# Patient Record
Sex: FEMALE | Race: WHITE | NOT HISPANIC OR LATINO | Employment: FULL TIME | URBAN - METROPOLITAN AREA
[De-identification: names, ages, dates, MRNs, and addresses within clinical notes are randomized per-mention and may not be internally consistent; named-entity substitution may affect disease eponyms.]

---

## 2019-07-12 ENCOUNTER — OFFICE VISIT (OUTPATIENT)
Dept: INTERNAL MEDICINE | Facility: CLINIC | Age: 24
End: 2019-07-12

## 2019-07-12 ENCOUNTER — HOSPITAL ENCOUNTER (OUTPATIENT)
Dept: GENERAL RADIOLOGY | Facility: HOSPITAL | Age: 24
Discharge: HOME | End: 2019-07-12

## 2019-07-12 ENCOUNTER — HOSPITAL ENCOUNTER (OUTPATIENT)
Dept: CT IMAGING | Facility: HOSPITAL | Age: 24
Discharge: HOME | End: 2019-07-12
Admitting: INTERNAL MEDICINE

## 2019-07-12 VITALS
HEART RATE: 78 BPM | HEIGHT: 65 IN | TEMPERATURE: 98.9 F | RESPIRATION RATE: 18 BRPM | WEIGHT: 176.2 LBS | SYSTOLIC BLOOD PRESSURE: 110 MMHG | OXYGEN SATURATION: 97 % | DIASTOLIC BLOOD PRESSURE: 72 MMHG | BODY MASS INDEX: 29.36 KG/M2

## 2019-07-12 DIAGNOSIS — R23.0 PERIORAL CYANOSIS: ICD-10-CM

## 2019-07-12 DIAGNOSIS — J45.50 SEVERE PERSISTENT ASTHMA, UNSPECIFIED WHETHER COMPLICATED: ICD-10-CM

## 2019-07-12 DIAGNOSIS — R01.1 HEART MURMUR: ICD-10-CM

## 2019-07-12 DIAGNOSIS — J45.50 SEVERE PERSISTENT ASTHMA, UNSPECIFIED WHETHER COMPLICATED: Primary | ICD-10-CM

## 2019-07-12 LAB
ALBUMIN SERPL-MCNC: 4.6 G/DL (ref 3.5–5.2)
ALBUMIN/GLOB SERPL: 1.6 G/DL
ALP SERPL-CCNC: 64 U/L (ref 39–117)
ALT SERPL W P-5'-P-CCNC: 18 U/L (ref 1–33)
ANION GAP SERPL CALCULATED.3IONS-SCNC: 15 MMOL/L (ref 5–15)
AST SERPL-CCNC: 16 U/L (ref 1–32)
BILIRUB SERPL-MCNC: 0.8 MG/DL (ref 0.2–1.2)
BUN BLD-MCNC: 12 MG/DL (ref 6–20)
BUN/CREAT SERPL: 11.9 (ref 7–25)
CALCIUM SPEC-SCNC: 9.9 MG/DL (ref 8.6–10.5)
CHLORIDE SERPL-SCNC: 101 MMOL/L (ref 98–107)
CO2 SERPL-SCNC: 24 MMOL/L (ref 22–29)
CREAT BLD-MCNC: 1.01 MG/DL (ref 0.57–1)
D DIMER PPP FEU-MCNC: <0.27 MCGFEU/ML (ref 0–0.56)
DEPRECATED RDW RBC AUTO: 40.4 FL (ref 37–54)
ERYTHROCYTE [DISTWIDTH] IN BLOOD BY AUTOMATED COUNT: 12.2 % (ref 12.3–15.4)
GFR SERPL CREATININE-BSD FRML MDRD: 67 ML/MIN/1.73
GLOBULIN UR ELPH-MCNC: 2.9 GM/DL
GLUCOSE BLD-MCNC: 94 MG/DL (ref 65–99)
HCT VFR BLD AUTO: 38.1 % (ref 34–46.6)
HGB BLD-MCNC: 12.3 G/DL (ref 12–15.9)
MCH RBC QN AUTO: 29.4 PG (ref 26.6–33)
MCHC RBC AUTO-ENTMCNC: 32.3 G/DL (ref 31.5–35.7)
MCV RBC AUTO: 90.9 FL (ref 79–97)
NT-PROBNP SERPL-MCNC: 10.2 PG/ML (ref 5–450)
PLATELET # BLD AUTO: 296 10*3/MM3 (ref 140–450)
PMV BLD AUTO: 11.9 FL (ref 6–12)
POTASSIUM BLD-SCNC: 4 MMOL/L (ref 3.5–5.2)
PROT SERPL-MCNC: 7.5 G/DL (ref 6–8.5)
RBC # BLD AUTO: 4.19 10*6/MM3 (ref 3.77–5.28)
SODIUM BLD-SCNC: 140 MMOL/L (ref 136–145)
TSH SERPL DL<=0.05 MIU/L-ACNC: 0.75 MIU/ML (ref 0.27–4.2)
WBC NRBC COR # BLD: 5.69 10*3/MM3 (ref 3.4–10.8)

## 2019-07-12 PROCEDURE — 99204 OFFICE O/P NEW MOD 45 MIN: CPT | Performed by: INTERNAL MEDICINE

## 2019-07-12 PROCEDURE — 83880 ASSAY OF NATRIURETIC PEPTIDE: CPT | Performed by: INTERNAL MEDICINE

## 2019-07-12 PROCEDURE — 80053 COMPREHEN METABOLIC PANEL: CPT | Performed by: INTERNAL MEDICINE

## 2019-07-12 PROCEDURE — 36415 COLL VENOUS BLD VENIPUNCTURE: CPT | Performed by: INTERNAL MEDICINE

## 2019-07-12 PROCEDURE — 71275 CT ANGIOGRAPHY CHEST: CPT

## 2019-07-12 PROCEDURE — 0 IOPAMIDOL PER 1 ML: Performed by: INTERNAL MEDICINE

## 2019-07-12 PROCEDURE — 71046 X-RAY EXAM CHEST 2 VIEWS: CPT

## 2019-07-12 PROCEDURE — 85379 FIBRIN DEGRADATION QUANT: CPT | Performed by: INTERNAL MEDICINE

## 2019-07-12 PROCEDURE — 85027 COMPLETE CBC AUTOMATED: CPT | Performed by: INTERNAL MEDICINE

## 2019-07-12 PROCEDURE — 93000 ELECTROCARDIOGRAM COMPLETE: CPT | Performed by: INTERNAL MEDICINE

## 2019-07-12 PROCEDURE — 84443 ASSAY THYROID STIM HORMONE: CPT | Performed by: INTERNAL MEDICINE

## 2019-07-12 RX ORDER — VALACYCLOVIR HYDROCHLORIDE 500 MG/1
500 TABLET, FILM COATED ORAL DAILY PRN
Refills: 3 | COMMUNITY
Start: 2019-04-29

## 2019-07-12 RX ORDER — DROSPIRENONE AND ETHINYL ESTRADIOL 0.02-3(28)
KIT ORAL DAILY
Refills: 0 | COMMUNITY
Start: 2019-06-03 | End: 2020-11-06

## 2019-07-12 RX ORDER — CHLORCYCLIZINE HYDROCHLORIDE AND PSEUDOEPHEDRINE HYDROCHLORIDE 25; 60 MG/1; MG/1
TABLET ORAL
Refills: 4 | COMMUNITY
Start: 2019-04-29 | End: 2021-09-24 | Stop reason: SDUPTHER

## 2019-07-12 RX ORDER — ALBUTEROL SULFATE 90 UG/1
2 AEROSOL, METERED RESPIRATORY (INHALATION) EVERY 4 HOURS PRN
COMMUNITY

## 2019-07-12 RX ORDER — BUDESONIDE AND FORMOTEROL FUMARATE DIHYDRATE 160; 4.5 UG/1; UG/1
2 AEROSOL RESPIRATORY (INHALATION)
COMMUNITY
Start: 2019-07-11 | End: 2020-12-08 | Stop reason: SDUPTHER

## 2019-07-12 RX ORDER — ALBUTEROL SULFATE 2.5 MG/3ML
2.5 SOLUTION RESPIRATORY (INHALATION) EVERY 4 HOURS PRN
COMMUNITY
End: 2019-07-19

## 2019-07-12 RX ADMIN — IOPAMIDOL 75 ML: 755 INJECTION, SOLUTION INTRAVENOUS at 15:55

## 2019-07-12 NOTE — ASSESSMENT & PLAN NOTE
Stable. Followed by Dr. Loyd. Continue Symbicort BID, PRN albuterol (rare use) and  Stayhist for allergies. Keep upcoming f/u appt.

## 2019-07-12 NOTE — ASSESSMENT & PLAN NOTE
Isolated episode of reported perioral cyanosis 4 weeks ago concerning for possible cardiac or pulmonary condition. Incomplete RBBB on EKG today (no baseline) and with new murmur on exam (possibly just a benign flow murmur) but there sounds like a FHx of some type of [place] [facility] [place] obtain CXR and ECHO. Due to abnormal EKG and exam findings, will also get CT Chest PE. Will also check CBC, CMP, BNP, D-dimer. Also would benefit from cardiology evaluation given low normal RA O2 sat during trending pOx. Advised to seek immediate/emergency medical care if she has sudden CP, SOB, inability to lie flat or for reoccurent perioral cyanosis.

## 2019-07-15 ENCOUNTER — HOSPITAL ENCOUNTER (OUTPATIENT)
Dept: CARDIOLOGY | Facility: HOSPITAL | Age: 24
Discharge: HOME | End: 2019-07-15
Admitting: INTERNAL MEDICINE

## 2019-07-15 DIAGNOSIS — R91.1 LUNG NODULE: Primary | ICD-10-CM

## 2019-07-15 DIAGNOSIS — R01.1 HEART MURMUR: ICD-10-CM

## 2019-07-15 LAB
BH CV ECHO MEAS - AI DEC SLOPE: 211 CM/SEC^2
BH CV ECHO MEAS - AO MAX PG (FULL): 3.4 MMHG
BH CV ECHO MEAS - AO MAX PG: 7 MMHG
BH CV ECHO MEAS - AO MEAN PG (FULL): 1 MMHG
BH CV ECHO MEAS - AO MEAN PG: 3 MMHG
BH CV ECHO MEAS - AO ROOT AREA (BSA CORRECTED): 1.7
BH CV ECHO MEAS - AO ROOT AREA: 7.5 CM^2
BH CV ECHO MEAS - AO ROOT DIAM: 3.1 CM
BH CV ECHO MEAS - AO V2 MAX: 129 CM/SEC
BH CV ECHO MEAS - AO V2 MEAN: 84 CM/SEC
BH CV ECHO MEAS - AO V2 VTI: 23.7 CM
BH CV ECHO MEAS - AVA(I,A): 2.5 CM^2
BH CV ECHO MEAS - AVA(I,D): 2.5 CM^2
BH CV ECHO MEAS - AVA(V,A): 2.3 CM^2
BH CV ECHO MEAS - AVA(V,D): 2.3 CM^2
BH CV ECHO MEAS - BSA(HAYCOCK): 1.9 M^2
BH CV ECHO MEAS - BSA: 1.9 M^2
BH CV ECHO MEAS - BZI_BMI: 29.1 KILOGRAMS/M^2
BH CV ECHO MEAS - BZI_METRIC_HEIGHT: 165.1 CM
BH CV ECHO MEAS - BZI_METRIC_WEIGHT: 79.4 KG
BH CV ECHO MEAS - EDV(CUBED): 76.8 ML
BH CV ECHO MEAS - EDV(MOD-SP4): 106 ML
BH CV ECHO MEAS - EDV(TEICH): 80.8 ML
BH CV ECHO MEAS - EF(CUBED): 73.8 %
BH CV ECHO MEAS - EF(MOD-SP4): 61.3 %
BH CV ECHO MEAS - EF(TEICH): 66 %
BH CV ECHO MEAS - ESV(CUBED): 20.1 ML
BH CV ECHO MEAS - ESV(MOD-SP4): 41 ML
BH CV ECHO MEAS - ESV(TEICH): 27.5 ML
BH CV ECHO MEAS - FS: 36 %
BH CV ECHO MEAS - IVS/LVPW: 0.9
BH CV ECHO MEAS - IVSD: 0.75 CM
BH CV ECHO MEAS - LA DIMENSION: 2.8 CM
BH CV ECHO MEAS - LA/AO: 0.9
BH CV ECHO MEAS - LAD MAJOR: 4.8 CM
BH CV ECHO MEAS - LAT PEAK E' VEL: 11.2 CM/SEC
BH CV ECHO MEAS - LATERAL E/E' RATIO: 5.7
BH CV ECHO MEAS - LV DIASTOLIC VOL/BSA (35-75): 56.7 ML/M^2
BH CV ECHO MEAS - LV MASS(C)D: 101.1 GRAMS
BH CV ECHO MEAS - LV MASS(C)DI: 54.1 GRAMS/M^2
BH CV ECHO MEAS - LV MAX PG: 3.6 MMHG
BH CV ECHO MEAS - LV MEAN PG: 2 MMHG
BH CV ECHO MEAS - LV SYSTOLIC VOL/BSA (12-30): 21.9 ML/M^2
BH CV ECHO MEAS - LV V1 MAX: 94.7 CM/SEC
BH CV ECHO MEAS - LV V1 MEAN: 55.8 CM/SEC
BH CV ECHO MEAS - LV V1 VTI: 19 CM
BH CV ECHO MEAS - LVIDD: 4.3 CM
BH CV ECHO MEAS - LVIDS: 2.7 CM
BH CV ECHO MEAS - LVLD AP4: 7.4 CM
BH CV ECHO MEAS - LVLS AP4: 5.8 CM
BH CV ECHO MEAS - LVOT AREA (M): 3.1 CM^2
BH CV ECHO MEAS - LVOT AREA: 3.1 CM^2
BH CV ECHO MEAS - LVOT DIAM: 2 CM
BH CV ECHO MEAS - LVPWD: 0.83 CM
BH CV ECHO MEAS - MED PEAK E' VEL: 9.6 CM/SEC
BH CV ECHO MEAS - MEDIAL E/E' RATIO: 6.6
BH CV ECHO MEAS - MV A MAX VEL: 53.3 CM/SEC
BH CV ECHO MEAS - MV E MAX VEL: 63.7 CM/SEC
BH CV ECHO MEAS - MV E/A: 1.2
BH CV ECHO MEAS - PA ACC SLOPE: 561 CM/SEC^2
BH CV ECHO MEAS - PA ACC TIME: 0.18 SEC
BH CV ECHO MEAS - PA PR(ACCEL): -2.9 MMHG
BH CV ECHO MEAS - SI(AO): 95.7 ML/M^2
BH CV ECHO MEAS - SI(CUBED): 30.3 ML/M^2
BH CV ECHO MEAS - SI(LVOT): 31.9 ML/M^2
BH CV ECHO MEAS - SI(MOD-SP4): 34.8 ML/M^2
BH CV ECHO MEAS - SI(TEICH): 28.5 ML/M^2
BH CV ECHO MEAS - SV(AO): 178.9 ML
BH CV ECHO MEAS - SV(CUBED): 56.6 ML
BH CV ECHO MEAS - SV(LVOT): 59.7 ML
BH CV ECHO MEAS - SV(MOD-SP4): 65 ML
BH CV ECHO MEAS - SV(TEICH): 53.3 ML
BH CV ECHO MEASUREMENTS AVERAGE E/E' RATIO: 6.13
LEFT ATRIUM VOLUME INDEX: 19.3 ML/M^2
LEFT ATRIUM VOLUME: 36 ML
LV EF 2D ECHO EST: 60 %

## 2019-07-15 PROCEDURE — 93306 TTE W/DOPPLER COMPLETE: CPT

## 2019-07-15 PROCEDURE — 93306 TTE W/DOPPLER COMPLETE: CPT | Performed by: INTERNAL MEDICINE

## 2019-07-15 NOTE — PROGRESS NOTES
OFFICE PROGRESS NOTE    Chief Complaint   Patient presents with   • Follow-up       HPI: 23 y.o. female here for:    Last seen 7/12/2018 as a new patient to establish care.  She had mentioned that approximately 4 weeks prior to that visit she had an episode of extreme fatigue, dyspnea and reportedly 20 minutes of vincent-oral cyanosis.  She did not seek medical care as her symptoms self resolved.  On exam she had stable vitals although she had a new systolic murmur on exam.  EKG showed an incomplete right bundle branch block which did not have her prior baseline.  Feels that a trending pulse ox with lowest O2 sat of 92% on room air.  She had a CT PE which was negative but did make incidental note of an of right upper lobe nodule for which a 3-six-month follow-up imaging study was recommended.  Chest x-ray was negative.  TTE 7/15/2019 showed LVEF 60%.  Trace aortic insufficiency.  Cannot rule out bicuspid aortic valve.  Subsequently patient underwent NELIDA on 7/24/2019 which showed EF of 56-60%, normal systolic function, normal aortic valve structure which is trileaflet.  Labs included a CBC, CMP, TSH, BNP, d-dimer which were all normal.      She is a cardiology (Dr. Cornejo) on 7/19/2019 who ordered NELIDA as above.  If her near syncopal episodes return or worsen, plan is possibly for exercise stress test.  She is to have a 3-month follow-up which is been scheduled on 10/25/2019.    Patient states that she feels okay.  No further episodes of near syncope or perioral cyanosis.  She has not yet returned to a full exercise regimen like she was doing prior.  She is not having any chest pain or dizziness.  She is not having any shortness of breath.  She states her last PFTs with her asthma specialist Dr. Loyd were in March 2019 (prior to the delivery of her now 4-month-old daughter).  She is not sure when her next scheduled follow-up with this provider is.    Review of Systems   Constitutional: Negative for activity change,  "appetite change and fever.   HENT: Negative for congestion, sneezing and sore throat.    Eyes: Negative for discharge and visual disturbance.   Respiratory: Negative for cough and shortness of breath.    Cardiovascular: Negative for chest pain and palpitations.   Gastrointestinal: Negative for abdominal distention, abdominal pain, blood in stool, constipation, nausea and vomiting.   Endocrine: Negative for cold intolerance and heat intolerance.   Genitourinary: Negative for dysuria.   Musculoskeletal: Negative for neck stiffness.   Skin: Negative for rash.   Allergic/Immunologic: Negative for environmental allergies and food allergies.   Neurological: Negative for headache.   Hematological: Negative for adenopathy.   Psychiatric/Behavioral: Negative for depressed mood.       The following portions of the patient's history were reviewed and updated as appropriate: allergies, current medications, past family history, past medical history, past social history, past surgical history and problem list.      Physical Exam:  Vitals:    07/26/19 1526   BP: 120/68   BP Location: Right arm   Patient Position: Sitting   Cuff Size: Adult   Pulse: 72   Resp: 18   Temp: 97.2 °F (36.2 °C)   TempSrc: Temporal   SpO2: 98%   Weight: 78.1 kg (172 lb 3.2 oz)   Height: 165.1 cm (65\")       Physical Exam   Constitutional: She is oriented to person, place, and time. She appears well-developed and well-nourished. No distress.   HENT:   Head: Normocephalic and atraumatic.   Eyes: Conjunctivae are normal. Right eye exhibits no discharge. Left eye exhibits no discharge.   Neck: Normal range of motion. Neck supple. No thyromegaly present.   Cardiovascular: Normal rate, regular rhythm and intact distal pulses. Exam reveals no gallop and no friction rub.   Murmur (Faint systolic murmur, softer than before) heard.  Pulmonary/Chest: Effort normal and breath sounds normal. No stridor. No respiratory distress. She has no wheezes. She has no rales. "   Musculoskeletal: She exhibits no edema.   Lymphadenopathy:     She has no cervical adenopathy.   Neurological: She is alert and oriented to person, place, and time. She exhibits normal muscle tone.   Skin: Skin is warm and dry. Capillary refill takes less than 2 seconds. No rash noted. She is not diaphoretic.   Psychiatric: She has a normal mood and affect.   Vitals reviewed.     Assesment and Plan: 23 y.o. female here for:  Severe persistent asthma  Stable.  Followed by Dr. Loyd.  Still awaiting records.  Continue Synacort twice daily, PRN albuterol (rare use) and stay hist for allergies.  Patient will call to see when her next scheduled follow-up is and let us know.  We will also go ahead and order PFTs (as the most recent set were done prior to pregnancy and before she had the reported episode of perioral cyanosis).  Patient will slowly go ahead and increase her physical activity/exertion level.  If she has any recurrent shortness of breath/presyncope/perioral cyanosis she needs immediate evaluation.  At that point we would want to consider possible stress test.      Heart murmur  Mild, intermittent murmur today with unremarkable NELIDA except mild aortic insufficiency.  To keep scheduled cardiology follow-up in October.  Patient will slowly advance her physical activity level.  If she has recurrent symptoms she needs reevaluation and consider stress test per cardiology.      Return for 2-3 fasting RPE fasting.    Pat Fields MD  7/26/2019

## 2019-07-19 ENCOUNTER — CONSULT (OUTPATIENT)
Dept: CARDIOLOGY | Facility: CLINIC | Age: 24
End: 2019-07-19

## 2019-07-19 VITALS
WEIGHT: 175 LBS | DIASTOLIC BLOOD PRESSURE: 74 MMHG | BODY MASS INDEX: 29.16 KG/M2 | HEIGHT: 65 IN | HEART RATE: 84 BPM | SYSTOLIC BLOOD PRESSURE: 124 MMHG

## 2019-07-19 DIAGNOSIS — Q23.1 BICUSPID AORTIC VALVE: ICD-10-CM

## 2019-07-19 DIAGNOSIS — R01.1 HEART MURMUR: Primary | ICD-10-CM

## 2019-07-19 DIAGNOSIS — Q23.1 AORTIC INSUFFICIENCY DUE TO BICUSPID AORTIC VALVE: ICD-10-CM

## 2019-07-19 DIAGNOSIS — J45.50 SEVERE PERSISTENT ASTHMA, UNSPECIFIED WHETHER COMPLICATED: ICD-10-CM

## 2019-07-19 PROBLEM — Q23.81 AORTIC INSUFFICIENCY DUE TO BICUSPID AORTIC VALVE: Status: ACTIVE | Noted: 2019-07-19

## 2019-07-19 PROCEDURE — 99204 OFFICE O/P NEW MOD 45 MIN: CPT | Performed by: INTERNAL MEDICINE

## 2019-07-19 NOTE — H&P (VIEW-ONLY)
OFFICE VISIT  NOTE  [facility] CARDIOLOGY      Name: Prachi Bailey    Date: 2019  MRN:  [id number]  :  1995      REFERRING/PRIMARY PROVIDER:  Pat Fields MD    Chief Complaint   Patient presents with   • Shortness of Breath   • Heart Murmur       HPI: Prachi Bailey is a 23 y.o. female who presents today for new consultation for murmur, and possible bicuspid aortic valve.  Patient had an uncle with bicuspid aortic valve, murmur noted by PCP, echocardiogram ordered, mild aortic insufficiency noted, cannot exclude bicuspid aortic valve, normal ejection fraction, with normal aortic root and ascending aortic dimensions.  Recent echo 2019 shows normal sinus rhythm, heart rate 60, incomplete right bundle branch block.  She had an episode approximately 1 month ago when she was walking, she developed sudden weakness, she had walked approximately 1 and half miles and was placing her 4-month-old daughter in stroller.  She sat down, she states her lips became blue, the episode lasted approximately 1 hour she felt weak, but no shortness of breath or chest pain.  She was slightly lightheaded but no dizziness, no syncope.  No further episodes and no previous episodes.  She usually perform the same walk multiple times per week.  She reports a normal pregnancy, her blood pressure was elevated which required induction, but otherwise normal.  She reports that she has an uncle that was diagnosed with bicuspid aortic valve after myocardia infarction.  Her mother has symptoms of shortness of breath, she left cardiac work-up soon.  Asthma symptoms are well controlled with inhalers.  No previous history of syncope, or exercise intolerance.    Past Medical History:   Diagnosis Date   • Abnormal ECG 18   • Aortic insufficiency due to bicuspid aortic valve 2019   • Asthma    • Heart murmur 19   • Heart murmur        History reviewed. No pertinent surgical  history.    Social History     Socioeconomic History   • Marital status:      Spouse name: Not on file   • Number of children: Not on file   • Years of education: Not on file   • Highest education level: Not on file   Tobacco Use   • Smoking status: Never Smoker   • Smokeless tobacco: Never Used   Substance and Sexual Activity   • Alcohol use: Yes     Alcohol/week: 0.6 oz     Types: 1 Glasses of wine per week   • Drug use: No   • Sexual activity: Yes     Partners: Male     Birth control/protection: OCP       Family History   Problem Relation Age of Onset   • Asthma Father    • Hypertension Father    • Hyperlipidemia Father    • Heart attack Maternal Uncle    • Heart attack Paternal Grandfather    • Stroke Paternal Grandfather    • Hyperlipidemia Paternal Grandfather    • Hypertension Paternal Grandfather    • Asthma Sister         ROS:   Constitutional no fever,  no weight loss   Skin no rash, no subcutaneous nodules   Otolaryngeal no difficulty swallowing   Cardiovascular See HPI   Pulmonary no cough, no sputum production   Gastrointestinal no constipation, no diarrhea   Genitourinary no dysuria, no hematuria   Hematologic no easy bruisability, no abnormal bleeding   Musculoskeletal no muscle pain   Neurologic no dizziness, no falls         No Known Allergies      Current Outpatient Medications:   •  Albuterol (VENTOLIN IN), Inhale As Needed., Disp: , Rfl:   •  albuterol sulfate  (90 Base) MCG/ACT inhaler, Inhale 2 puffs Every 4 (Four) Hours As Needed for Wheezing., Disp: , Rfl:   •  drospirenone-ethinyl estradiol (RADHA,GIANVI) 3-0.02 MG per tablet, Take  by mouth Daily., Disp: , Rfl: 0  •  STAHIST AD 25-60 MG tablet, TK 1 T PO Q 12 HOURS PRN, Disp: , Rfl: 4  •  SYMBICORT 160-4.5 MCG/ACT inhaler, , Disp: , Rfl:   •  valACYclovir (VALTREX) 500 MG tablet, Take 500 mg by mouth Daily., Disp: , Rfl: 3    Vitals:    07/19/19 1247   BP: 124/74   BP Location: Left arm   Patient Position: Sitting   Pulse: 84  "  Weight: 79.4 kg (175 lb)   Height: 165.1 cm (65\")     Body mass index is 29.12 kg/m².    PHYSICAL EXAM:    General Appearance:   · well developed  · well nourished  HENT:   · oropharynx moist  · lips not cyanotic  Neck:  · thyroid not enlarged  · supple  Respiratory:  · no respiratory distress  · normal breath sounds  · no rales  Cardiovascular:  · no jugular venous distention  · regular rhythm  · apical impulse normal  · S1 normal, S2 normal  · no S3, no S4   · 2/6 systolic murmur, best heard at the sternal border.  · no rub, no thrill  · carotid pulses normal; no bruit  · pedal pulses normal  · lower extremity edema: none    Gastrointestinal:   · bowel sounds normal  · non-tender  · no hepatomegaly, no splenomegaly  Musculoskeletal:  · no clubbing of fingers.   · normocephalic, head atraumatic  Skin:   · warm, dry  Psychiatric:  · judgement and insight appropriate  · normal mood and affect    RESULTS:   Procedures    Results for orders placed during the hospital encounter of 07/15/19   Adult Transthoracic Echo Complete W/ Cont if Necessary Per Protocol    Narrative · Left ventricular systolic function is normal. Estimated EF = 60%.  · Trace aortic insufficiency. Cannot rule out bicuspid aortic valve.            Labs:  Lab Results   Component Value Date    AST 16 07/12/2019    ALT 18 07/12/2019     No results found for: HGBA1C  No components found for: CREATINININE  eGFR Non  Amer   Date Value Ref Range Status   07/12/2019 67 >60 mL/min/1.73 Final         ASSESSMENT:  Problem List Items Addressed This Visit        Cardiovascular and Mediastinum    Heart murmur - Primary    Aortic insufficiency due to bicuspid aortic valve       Respiratory    Severe persistent asthma    Relevant Medications    Albuterol (VENTOLIN IN)          PLAN:    1.  Cardiac murmur:  Transthoracic echo images personally reviewed, I reviewed them with the patient in the office as well  Unfortunately there was artifact and inability to " see the aortic leaflets clearly  Therefore recommend a transesophageal echocardiogram  All risk benefits alternatives explained in detail with the patient, she is agreeable to proceed.  Aortic root and ascending aorta dimensions normal on transthoracic echo, will reevaluate with NELIDA.    If bicuspid aortic valve is confirmed by NELIDA, all first-degree relatives will need to be screened with echo.    2.  Possible bicuspid aortic valve with mild aortic insufficiency by transthoracic echo:  Proceed with NELIDA for further evaluation and work-up.    3.  Near syncopal episode:  She denies palpitations, denies chest pain, denies shortness of breath  Unlikely that this event was related to possible bicuspid aortic valve and mild aortic insufficiency  Recommend patient increase her water intake  Call us if symptoms return or worsen and we may proceed with exercise stress test.    4.  Severe persistent asthma:  Continue Symbicort and albuterol as needed.    Return to clinic in 3 months    Thank you for the opportunity to share in the care of your patient; please do not hesitate to call me with any questions.     Michael Cornejo MD, [facility]  Office: [phone] [address] [place] [address]

## 2019-07-19 NOTE — PROGRESS NOTES
OFFICE VISIT  NOTE  [facility] CARDIOLOGY      Name: Prachi Bailey    Date: 2019  MRN:  [id number]  :  1995      REFERRING/PRIMARY PROVIDER:  Pat Fields MD    Chief Complaint   Patient presents with   • Shortness of Breath   • Heart Murmur       HPI: Prachi Bailey is a 23 y.o. female who presents today for new consultation for murmur, and possible bicuspid aortic valve.  Patient had an uncle with bicuspid aortic valve, murmur noted by PCP, echocardiogram ordered, mild aortic insufficiency noted, cannot exclude bicuspid aortic valve, normal ejection fraction, with normal aortic root and ascending aortic dimensions.  Recent echo 2019 shows normal sinus rhythm, heart rate 60, incomplete right bundle branch block.  She had an episode approximately 1 month ago when she was walking, she developed sudden weakness, she had walked approximately 1 and half miles and was placing her 4-month-old daughter in stroller.  She sat down, she states her lips became blue, the episode lasted approximately 1 hour she felt weak, but no shortness of breath or chest pain.  She was slightly lightheaded but no dizziness, no syncope.  No further episodes and no previous episodes.  She usually perform the same walk multiple times per week.  She reports a normal pregnancy, her blood pressure was elevated which required induction, but otherwise normal.  She reports that she has an uncle that was diagnosed with bicuspid aortic valve after myocardia infarction.  Her mother has symptoms of shortness of breath, she left cardiac work-up soon.  Asthma symptoms are well controlled with inhalers.  No previous history of syncope, or exercise intolerance.    Past Medical History:   Diagnosis Date   • Abnormal ECG 18   • Aortic insufficiency due to bicuspid aortic valve 2019   • Asthma    • Heart murmur 19   • Heart murmur        History reviewed. No pertinent surgical  history.    Social History     Socioeconomic History   • Marital status:      Spouse name: Not on file   • Number of children: Not on file   • Years of education: Not on file   • Highest education level: Not on file   Tobacco Use   • Smoking status: Never Smoker   • Smokeless tobacco: Never Used   Substance and Sexual Activity   • Alcohol use: Yes     Alcohol/week: 0.6 oz     Types: 1 Glasses of wine per week   • Drug use: No   • Sexual activity: Yes     Partners: Male     Birth control/protection: OCP       Family History   Problem Relation Age of Onset   • Asthma Father    • Hypertension Father    • Hyperlipidemia Father    • Heart attack Maternal Uncle    • Heart attack Paternal Grandfather    • Stroke Paternal Grandfather    • Hyperlipidemia Paternal Grandfather    • Hypertension Paternal Grandfather    • Asthma Sister         ROS:   Constitutional no fever,  no weight loss   Skin no rash, no subcutaneous nodules   Otolaryngeal no difficulty swallowing   Cardiovascular See HPI   Pulmonary no cough, no sputum production   Gastrointestinal no constipation, no diarrhea   Genitourinary no dysuria, no hematuria   Hematologic no easy bruisability, no abnormal bleeding   Musculoskeletal no muscle pain   Neurologic no dizziness, no falls         No Known Allergies      Current Outpatient Medications:   •  Albuterol (VENTOLIN IN), Inhale As Needed., Disp: , Rfl:   •  albuterol sulfate  (90 Base) MCG/ACT inhaler, Inhale 2 puffs Every 4 (Four) Hours As Needed for Wheezing., Disp: , Rfl:   •  drospirenone-ethinyl estradiol (RADHA,GIANVI) 3-0.02 MG per tablet, Take  by mouth Daily., Disp: , Rfl: 0  •  STAHIST AD 25-60 MG tablet, TK 1 T PO Q 12 HOURS PRN, Disp: , Rfl: 4  •  SYMBICORT 160-4.5 MCG/ACT inhaler, , Disp: , Rfl:   •  valACYclovir (VALTREX) 500 MG tablet, Take 500 mg by mouth Daily., Disp: , Rfl: 3    Vitals:    07/19/19 1247   BP: 124/74   BP Location: Left arm   Patient Position: Sitting   Pulse: 84  "  Weight: 79.4 kg (175 lb)   Height: 165.1 cm (65\")     Body mass index is 29.12 kg/m².    PHYSICAL EXAM:    General Appearance:   · well developed  · well nourished  HENT:   · oropharynx moist  · lips not cyanotic  Neck:  · thyroid not enlarged  · supple  Respiratory:  · no respiratory distress  · normal breath sounds  · no rales  Cardiovascular:  · no jugular venous distention  · regular rhythm  · apical impulse normal  · S1 normal, S2 normal  · no S3, no S4   · 2/6 systolic murmur, best heard at the sternal border.  · no rub, no thrill  · carotid pulses normal; no bruit  · pedal pulses normal  · lower extremity edema: none    Gastrointestinal:   · bowel sounds normal  · non-tender  · no hepatomegaly, no splenomegaly  Musculoskeletal:  · no clubbing of fingers.   · normocephalic, head atraumatic  Skin:   · warm, dry  Psychiatric:  · judgement and insight appropriate  · normal mood and affect    RESULTS:   Procedures    Results for orders placed during the hospital encounter of 07/15/19   Adult Transthoracic Echo Complete W/ Cont if Necessary Per Protocol    Narrative · Left ventricular systolic function is normal. Estimated EF = 60%.  · Trace aortic insufficiency. Cannot rule out bicuspid aortic valve.            Labs:  Lab Results   Component Value Date    AST 16 07/12/2019    ALT 18 07/12/2019     No results found for: HGBA1C  No components found for: CREATINININE  eGFR Non  Amer   Date Value Ref Range Status   07/12/2019 67 >60 mL/min/1.73 Final         ASSESSMENT:  Problem List Items Addressed This Visit        Cardiovascular and Mediastinum    Heart murmur - Primary    Aortic insufficiency due to bicuspid aortic valve       Respiratory    Severe persistent asthma    Relevant Medications    Albuterol (VENTOLIN IN)          PLAN:    1.  Cardiac murmur:  Transthoracic echo images personally reviewed, I reviewed them with the patient in the office as well  Unfortunately there was artifact and inability to " see the aortic leaflets clearly  Therefore recommend a transesophageal echocardiogram  All risk benefits alternatives explained in detail with the patient, she is agreeable to proceed.  Aortic root and ascending aorta dimensions normal on transthoracic echo, will reevaluate with NELIDA.    If bicuspid aortic valve is confirmed by NELIDA, all first-degree relatives will need to be screened with echo.    2.  Possible bicuspid aortic valve with mild aortic insufficiency by transthoracic echo:  Proceed with NELIDA for further evaluation and work-up.    3.  Near syncopal episode:  She denies palpitations, denies chest pain, denies shortness of breath  Unlikely that this event was related to possible bicuspid aortic valve and mild aortic insufficiency  Recommend patient increase her water intake  Call us if symptoms return or worsen and we may proceed with exercise stress test.    4.  Severe persistent asthma:  Continue Symbicort and albuterol as needed.    Return to clinic in 3 months    Thank you for the opportunity to share in the care of your patient; please do not hesitate to call me with any questions.     Michael Cornejo MD, [facility]  Office: [phone] [address] [place] [address]

## 2019-07-19 NOTE — H&P (VIEW-ONLY)
OFFICE VISIT  NOTE  [facility] CARDIOLOGY      Name: Prachi Bailey    Date: 2019  MRN:  [id number]  :  1995      REFERRING/PRIMARY PROVIDER:  Pat Fields MD    Chief Complaint   Patient presents with   • Shortness of Breath   • Heart Murmur       HPI: Prachi Bailey is a 23 y.o. female who presents today for new consultation for murmur, and possible bicuspid aortic valve.  Patient had an uncle with bicuspid aortic valve, murmur noted by PCP, echocardiogram ordered, mild aortic insufficiency noted, cannot exclude bicuspid aortic valve, normal ejection fraction, with normal aortic root and ascending aortic dimensions.  Recent echo 2019 shows normal sinus rhythm, heart rate 60, incomplete right bundle branch block.  She had an episode approximately 1 month ago when she was walking, she developed sudden weakness, she had walked approximately 1 and half miles and was placing her 4-month-old daughter in stroller.  She sat down, she states her lips became blue, the episode lasted approximately 1 hour she felt weak, but no shortness of breath or chest pain.  She was slightly lightheaded but no dizziness, no syncope.  No further episodes and no previous episodes.  She usually perform the same walk multiple times per week.  She reports a normal pregnancy, her blood pressure was elevated which required induction, but otherwise normal.  She reports that she has an uncle that was diagnosed with bicuspid aortic valve after myocardia infarction.  Her mother has symptoms of shortness of breath, she left cardiac work-up soon.  Asthma symptoms are well controlled with inhalers.  No previous history of syncope, or exercise intolerance.    Past Medical History:   Diagnosis Date   • Abnormal ECG 18   • Aortic insufficiency due to bicuspid aortic valve 2019   • Asthma    • Heart murmur 19   • Heart murmur        History reviewed. No pertinent surgical  history.    Social History     Socioeconomic History   • Marital status:      Spouse name: Not on file   • Number of children: Not on file   • Years of education: Not on file   • Highest education level: Not on file   Tobacco Use   • Smoking status: Never Smoker   • Smokeless tobacco: Never Used   Substance and Sexual Activity   • Alcohol use: Yes     Alcohol/week: 0.6 oz     Types: 1 Glasses of wine per week   • Drug use: No   • Sexual activity: Yes     Partners: Male     Birth control/protection: OCP       Family History   Problem Relation Age of Onset   • Asthma Father    • Hypertension Father    • Hyperlipidemia Father    • Heart attack Maternal Uncle    • Heart attack Paternal Grandfather    • Stroke Paternal Grandfather    • Hyperlipidemia Paternal Grandfather    • Hypertension Paternal Grandfather    • Asthma Sister         ROS:   Constitutional no fever,  no weight loss   Skin no rash, no subcutaneous nodules   Otolaryngeal no difficulty swallowing   Cardiovascular See HPI   Pulmonary no cough, no sputum production   Gastrointestinal no constipation, no diarrhea   Genitourinary no dysuria, no hematuria   Hematologic no easy bruisability, no abnormal bleeding   Musculoskeletal no muscle pain   Neurologic no dizziness, no falls         No Known Allergies      Current Outpatient Medications:   •  Albuterol (VENTOLIN IN), Inhale As Needed., Disp: , Rfl:   •  albuterol sulfate  (90 Base) MCG/ACT inhaler, Inhale 2 puffs Every 4 (Four) Hours As Needed for Wheezing., Disp: , Rfl:   •  drospirenone-ethinyl estradiol (RADHA,GIANVI) 3-0.02 MG per tablet, Take  by mouth Daily., Disp: , Rfl: 0  •  STAHIST AD 25-60 MG tablet, TK 1 T PO Q 12 HOURS PRN, Disp: , Rfl: 4  •  SYMBICORT 160-4.5 MCG/ACT inhaler, , Disp: , Rfl:   •  valACYclovir (VALTREX) 500 MG tablet, Take 500 mg by mouth Daily., Disp: , Rfl: 3    Vitals:    07/19/19 1247   BP: 124/74   BP Location: Left arm   Patient Position: Sitting   Pulse: 84  "  Weight: 79.4 kg (175 lb)   Height: 165.1 cm (65\")     Body mass index is 29.12 kg/m².    PHYSICAL EXAM:    General Appearance:   · well developed  · well nourished  HENT:   · oropharynx moist  · lips not cyanotic  Neck:  · thyroid not enlarged  · supple  Respiratory:  · no respiratory distress  · normal breath sounds  · no rales  Cardiovascular:  · no jugular venous distention  · regular rhythm  · apical impulse normal  · S1 normal, S2 normal  · no S3, no S4   · 2/6 systolic murmur, best heard at the sternal border.  · no rub, no thrill  · carotid pulses normal; no bruit  · pedal pulses normal  · lower extremity edema: none    Gastrointestinal:   · bowel sounds normal  · non-tender  · no hepatomegaly, no splenomegaly  Musculoskeletal:  · no clubbing of fingers.   · normocephalic, head atraumatic  Skin:   · warm, dry  Psychiatric:  · judgement and insight appropriate  · normal mood and affect    RESULTS:   Procedures    Results for orders placed during the hospital encounter of 07/15/19   Adult Transthoracic Echo Complete W/ Cont if Necessary Per Protocol    Narrative · Left ventricular systolic function is normal. Estimated EF = 60%.  · Trace aortic insufficiency. Cannot rule out bicuspid aortic valve.            Labs:  Lab Results   Component Value Date    AST 16 07/12/2019    ALT 18 07/12/2019     No results found for: HGBA1C  No components found for: CREATINININE  eGFR Non  Amer   Date Value Ref Range Status   07/12/2019 67 >60 mL/min/1.73 Final         ASSESSMENT:  Problem List Items Addressed This Visit        Cardiovascular and Mediastinum    Heart murmur - Primary    Aortic insufficiency due to bicuspid aortic valve       Respiratory    Severe persistent asthma    Relevant Medications    Albuterol (VENTOLIN IN)          PLAN:    1.  Cardiac murmur:  Transthoracic echo images personally reviewed, I reviewed them with the patient in the office as well  Unfortunately there was artifact and inability to " see the aortic leaflets clearly  Therefore recommend a transesophageal echocardiogram  All risk benefits alternatives explained in detail with the patient, she is agreeable to proceed.  Aortic root and ascending aorta dimensions normal on transthoracic echo, will reevaluate with NELIDA.    If bicuspid aortic valve is confirmed by NELIDA, all first-degree relatives will need to be screened with echo.    2.  Possible bicuspid aortic valve with mild aortic insufficiency by transthoracic echo:  Proceed with NELIDA for further evaluation and work-up.    3.  Near syncopal episode:  She denies palpitations, denies chest pain, denies shortness of breath  Unlikely that this event was related to possible bicuspid aortic valve and mild aortic insufficiency  Recommend patient increase her water intake  Call us if symptoms return or worsen and we may proceed with exercise stress test.    4.  Severe persistent asthma:  Continue Symbicort and albuterol as needed.    Return to clinic in 3 months    Thank you for the opportunity to share in the care of your patient; please do not hesitate to call me with any questions.     Michael Cornejo MD, [facility]  Office: [phone] [address] [place] [address]

## 2019-07-24 ENCOUNTER — HOSPITAL ENCOUNTER (OUTPATIENT)
Dept: CARDIOLOGY | Facility: HOSPITAL | Age: 24
Discharge: HOME | End: 2019-07-24
Admitting: INTERNAL MEDICINE

## 2019-07-24 VITALS
BODY MASS INDEX: 29.16 KG/M2 | SYSTOLIC BLOOD PRESSURE: 115 MMHG | DIASTOLIC BLOOD PRESSURE: 91 MMHG | HEART RATE: 64 BPM | RESPIRATION RATE: 18 BRPM | OXYGEN SATURATION: 100 % | HEIGHT: 65 IN | WEIGHT: 175 LBS

## 2019-07-24 DIAGNOSIS — Q23.1 BICUSPID AORTIC VALVE: ICD-10-CM

## 2019-07-24 DIAGNOSIS — R01.1 HEART MURMUR: ICD-10-CM

## 2019-07-24 LAB
ASCENDING AORTA: 3 CM
BH CV ECHO MEAS - BSA(HAYCOCK): 1.9 M^2
BH CV ECHO MEAS - BSA: 1.9 M^2
BH CV ECHO MEAS - BZI_BMI: 29.1 KILOGRAMS/M^2
BH CV ECHO MEAS - BZI_METRIC_HEIGHT: 165.1 CM
BH CV ECHO MEAS - BZI_METRIC_WEIGHT: 79.4 KG
BH CV VAS BP LEFT ARM: NORMAL MMHG

## 2019-07-24 PROCEDURE — 93312 ECHO TRANSESOPHAGEAL: CPT | Performed by: INTERNAL MEDICINE

## 2019-07-24 PROCEDURE — 99152 MOD SED SAME PHYS/QHP 5/>YRS: CPT | Performed by: INTERNAL MEDICINE

## 2019-07-24 PROCEDURE — 99152 MOD SED SAME PHYS/QHP 5/>YRS: CPT

## 2019-07-24 PROCEDURE — 25010000002 MIDAZOLAM PER 1 MG: Performed by: INTERNAL MEDICINE

## 2019-07-24 PROCEDURE — 93320 DOPPLER ECHO COMPLETE: CPT | Performed by: INTERNAL MEDICINE

## 2019-07-24 PROCEDURE — 25010000002 FENTANYL CITRATE (PF) 100 MCG/2ML SOLUTION: Performed by: INTERNAL MEDICINE

## 2019-07-24 PROCEDURE — 93325 DOPPLER ECHO COLOR FLOW MAPG: CPT | Performed by: INTERNAL MEDICINE

## 2019-07-24 PROCEDURE — 93320 DOPPLER ECHO COMPLETE: CPT

## 2019-07-24 PROCEDURE — 93312 ECHO TRANSESOPHAGEAL: CPT

## 2019-07-24 PROCEDURE — 93325 DOPPLER ECHO COLOR FLOW MAPG: CPT

## 2019-07-24 RX ORDER — FENTANYL CITRATE 50 UG/ML
INJECTION, SOLUTION INTRAMUSCULAR; INTRAVENOUS
Status: COMPLETED | OUTPATIENT
Start: 2019-07-24 | End: 2019-07-24

## 2019-07-24 RX ORDER — MIDAZOLAM HYDROCHLORIDE 1 MG/ML
INJECTION INTRAMUSCULAR; INTRAVENOUS
Status: COMPLETED | OUTPATIENT
Start: 2019-07-24 | End: 2019-07-24

## 2019-07-24 RX ADMIN — FENTANYL CITRATE 50 MCG: 50 INJECTION, SOLUTION INTRAMUSCULAR; INTRAVENOUS at 10:23

## 2019-07-24 RX ADMIN — FENTANYL CITRATE 50 MCG: 50 INJECTION, SOLUTION INTRAMUSCULAR; INTRAVENOUS at 10:19

## 2019-07-24 RX ADMIN — METHOHEXITAL SODIUM 20 MG: 500 INJECTION, POWDER, LYOPHILIZED, FOR SOLUTION INTRAMUSCULAR; INTRAVENOUS; RECTAL at 10:26

## 2019-07-24 RX ADMIN — MIDAZOLAM HYDROCHLORIDE 2 MG: 1 INJECTION, SOLUTION INTRAMUSCULAR; INTRAVENOUS at 10:19

## 2019-07-24 RX ADMIN — MIDAZOLAM HYDROCHLORIDE 2 MG: 1 INJECTION, SOLUTION INTRAMUSCULAR; INTRAVENOUS at 10:23

## 2019-07-24 NOTE — INTERVAL H&P NOTE
H&P reviewed. The patient was examined and there are no changes to the H&P.     All risks benefits and alternatives to the NELIDA procedure were explained in detail to the patient, she is agreeable to proceed.

## 2019-07-24 NOTE — INTERVAL H&P NOTE
H&P reviewed. The patient was examined and there are no changes to the H&P.     Patient presents today for NELIDA. The risks, benefits, and alternatives of the procedure have been reviewed and the patient wishes to proceed.     Proceed as planned.     Electronically signed by EVELIN Lee, 07/24/19, 9:32 AM.

## 2019-07-26 ENCOUNTER — OFFICE VISIT (OUTPATIENT)
Dept: INTERNAL MEDICINE | Facility: CLINIC | Age: 24
End: 2019-07-26

## 2019-07-26 ENCOUNTER — TELEPHONE (OUTPATIENT)
Dept: INTERNAL MEDICINE | Facility: CLINIC | Age: 24
End: 2019-07-26

## 2019-07-26 VITALS
SYSTOLIC BLOOD PRESSURE: 120 MMHG | WEIGHT: 172.2 LBS | BODY MASS INDEX: 28.69 KG/M2 | OXYGEN SATURATION: 98 % | HEART RATE: 72 BPM | HEIGHT: 65 IN | RESPIRATION RATE: 18 BRPM | DIASTOLIC BLOOD PRESSURE: 68 MMHG | TEMPERATURE: 97.2 F

## 2019-07-26 DIAGNOSIS — J45.50 SEVERE PERSISTENT ASTHMA, UNSPECIFIED WHETHER COMPLICATED: Primary | ICD-10-CM

## 2019-07-26 DIAGNOSIS — R01.1 HEART MURMUR: ICD-10-CM

## 2019-07-26 PROCEDURE — 99213 OFFICE O/P EST LOW 20 MIN: CPT | Performed by: INTERNAL MEDICINE

## 2019-07-26 NOTE — ASSESSMENT & PLAN NOTE
Mild, intermittent murmur today with unremarkable NELIDA except mild aortic insufficiency.  To keep scheduled cardiology follow-up in October.  Patient will slowly advance her physical activity level.  If she has recurrent symptoms she needs reevaluation and consider stress test per cardiology.

## 2019-07-26 NOTE — ASSESSMENT & PLAN NOTE
Stable.  Followed by Dr. Loyd.  Still awaiting records.  Continue Synacort twice daily, PRN albuterol (rare use) and stay hist for allergies.  Patient will call to see when her next scheduled follow-up is and let us know.  We will also go ahead and order PFTs (as the most recent set were done prior to pregnancy and before she had the reported episode of perioral cyanosis).  Patient will slowly go ahead and increase her physical activity/exertion level.  If she has any recurrent shortness of breath/presyncope/perioral cyanosis she needs immediate evaluation.  At that point we would want to consider possible stress test.

## 2019-07-26 NOTE — TELEPHONE ENCOUNTER
----- Message from Carlee Epperson sent at 7/26/2019  3:55 PM EDT -----  Prachi is adamant about seeing you for her fasting PHY the week of August 12th-16th but there are no slots available early morning. Is there any where I can fit her in for you?    Pt can be reached at [phone] with questions.    Thank you.

## 2019-08-09 ENCOUNTER — HOSPITAL ENCOUNTER (OUTPATIENT)
Dept: PULMONOLOGY | Facility: HOSPITAL | Age: 24
Discharge: HOME | End: 2019-08-09
Admitting: INTERNAL MEDICINE

## 2019-08-09 DIAGNOSIS — R01.1 HEART MURMUR: ICD-10-CM

## 2019-08-09 DIAGNOSIS — J45.50 SEVERE PERSISTENT ASTHMA, UNSPECIFIED WHETHER COMPLICATED: ICD-10-CM

## 2019-08-09 PROCEDURE — 94060 EVALUATION OF WHEEZING: CPT | Performed by: INTERNAL MEDICINE

## 2019-08-09 PROCEDURE — 94727 GAS DIL/WSHOT DETER LNG VOL: CPT

## 2019-08-09 PROCEDURE — 94727 GAS DIL/WSHOT DETER LNG VOL: CPT | Performed by: INTERNAL MEDICINE

## 2019-08-09 PROCEDURE — 94729 DIFFUSING CAPACITY: CPT

## 2019-08-09 PROCEDURE — 94060 EVALUATION OF WHEEZING: CPT

## 2019-08-09 PROCEDURE — 94729 DIFFUSING CAPACITY: CPT | Performed by: INTERNAL MEDICINE

## 2019-08-09 NOTE — PROGRESS NOTES
Adult RPE:  Patient Care Team:  Pat Fields MD as PCP - General (Internal Medicine)    Chief Complaint   Patient presents with   • Annual Exam       Prachi Bailey is a 23 y.o. female who presents for her yearly physical exam.     HPI Issues discussed today:    1. Asthma:  Follows with Dr. Loyd. On symbicort, albuterol PRN and Stayhist.     2. Episode of near syncope an perioral cyanosis:  Occurred a few weeks prior to new pt visit 7/12 and has not reoccured. EKG with incomplete RBBB. CT PE negative but did have RUL nodule for which 3-6 month follow up was recommended. Chest x-ray was negative.  TTE 7/15/2019 showed LVEF 60%.  Trace aortic insufficiency.  Cannot rule out bicuspid aortic valve.  Subsequently patient underwent NELIDA on 7/24/2019 which showed EF of 56-60%, normal systolic function, normal aortic valve structure which is trileaflet.  Labs included a CBC, CMP, TSH, BNP, d-dimer which were all normal. PFTs were normal.    She is a cardiology (Dr. Cornejo) on 7/19/2019 who ordered NELIDA as above.  If her near syncopal episodes return or worsen, plan is possibly for exercise stress test.  She is to have a 3-month follow-up which is been scheduled on 10/25/2019.    3. Lung Nodule:  RUL nodule as above. F/u scan scheduled 10/16/19.    Review of Systems   Constitutional: Negative for activity change, appetite change and fever.   HENT: Negative for congestion, sneezing and sore throat.    Eyes: Negative for discharge and visual disturbance.   Respiratory: Negative for cough and shortness of breath.    Cardiovascular: Negative for chest pain and palpitations.   Gastrointestinal: Negative for abdominal distention, abdominal pain, blood in stool, constipation, nausea and vomiting.   Endocrine: Negative for cold intolerance and heat intolerance.   Genitourinary: Negative for dysuria.   Musculoskeletal: Negative for neck stiffness.   Skin: Negative for rash.   Allergic/Immunologic: Negative for environmental  allergies and food allergies.   Neurological: Negative for headache.   Hematological: Negative for adenopathy.   Psychiatric/Behavioral: Negative for depressed mood.        History  Past Medical History:   Diagnosis Date   • Abnormal ECG 07/12/18   • Aortic insufficiency due to bicuspid aortic valve 7/19/2019   • Asthma    • Heart murmur 07/12/19   • Heart murmur       History reviewed. No pertinent surgical history.   No Known Allergies   Family History   Problem Relation Age of Onset   • Asthma Father    • Hypertension Father    • Hyperlipidemia Father    • Heart attack Maternal Uncle    • Heart attack Paternal Grandfather    • Stroke Paternal Grandfather    • Hyperlipidemia Paternal Grandfather    • Hypertension Paternal Grandfather    • Asthma Sister       Social History     Socioeconomic History   • Marital status:      Spouse name: Not on file   • Number of children: Not on file   • Years of education: Not on file   • Highest education level: Not on file   Tobacco Use   • Smoking status: Never Smoker   • Smokeless tobacco: Never Used   Substance and Sexual Activity   • Alcohol use: Yes     Alcohol/week: 0.6 oz     Types: 1 Glasses of wine per week   • Drug use: No   • Sexual activity: Yes     Partners: Male     Birth control/protection: OCP        Current Outpatient Medications:   •  Albuterol (VENTOLIN IN), Inhale As Needed., Disp: , Rfl:   •  albuterol sulfate  (90 Base) MCG/ACT inhaler, Inhale 2 puffs Every 4 (Four) Hours As Needed for Wheezing., Disp: , Rfl:   •  drospirenone-ethinyl estradiol (RADHA,GIANVI) 3-0.02 MG per tablet, Take  by mouth Daily., Disp: , Rfl: 0  •  STAHIST AD 25-60 MG tablet, TK 1 T PO Q 12 HOURS PRN, Disp: , Rfl: 4  •  SYMBICORT 160-4.5 MCG/ACT inhaler, , Disp: , Rfl:   •  valACYclovir (VALTREX) 500 MG tablet, Take 500 mg by mouth Daily., Disp: , Rfl: 3    Health Maintenance   Topic Date Due   • ANNUAL PHYSICAL  09/09/1996   • HPV VACCINES (1 - Female 3-dose series)  "2008   • TDAP/TD VACCINES (1 - Tdap) 2012   • PAP SMEAR  2019   • INFLUENZA VACCINE  2019       Immunizations  Td/Tdap(Booster Q 10 yrs):  2018 with GYN; still awaiting records.  Pneumovax (1 yr after Prevnar):  Give today given history of asthma.  Hep A:  Does #1 today, RTC in 6 months for dose #2.      Immunization History   Administered Date(s) Administered   • Hepatitis A 2019   • Pneumococcal Polysaccharide (PPSV23) 2019         Patient's Body mass index is 29.35 kg/m². BMI is above normal parameters. Recommendations include: educational material, exercise counseling and nutrition counseling.      Colorectal Screening:  N/A  Pap: Sees Karey martínez/ [facility]; pap last week. Results pending  Mammogram:  N/A  PSA(Over age 50):  N/A  US Aorta (For male smokers, age 65):  N/A  CT for Smoker (Age 55-75, 30pk yr):  N/A  Bone Density/DEXA:  N/A  Hep C ( 3854-2376):  N/A        Vitals:    19 1013   BP: 108/68   BP Location: Right arm   Patient Position: Sitting   Cuff Size: Adult   Pulse: 65   Resp: 18   Temp: 97.9 °F (36.6 °C)   TempSrc: Temporal   SpO2: 98%   Weight: 80 kg (176 lb 6.4 oz)   Height: 165.1 cm (65\")   PainSc: 0-No pain         Physical Exam   Constitutional: She is oriented to person, place, and time. She appears well-developed and well-nourished. No distress.   HENT:   Head: Normocephalic and atraumatic.   Right Ear: Tympanic membrane and external ear normal.   Left Ear: Tympanic membrane and external ear normal.   Mouth/Throat: Oropharynx is clear and moist. No oropharyngeal exudate.   Eyes: Conjunctivae and EOM are normal. Pupils are equal, round, and reactive to light. Right eye exhibits no discharge. Left eye exhibits no discharge. No scleral icterus.   Neck: Normal range of motion. Neck supple. No JVD present. No tracheal deviation present. No thyromegaly present.   Cardiovascular: Normal rate, regular rhythm and normal heart sounds. Exam " reveals no gallop and no friction rub.   No murmur heard.  Pulmonary/Chest: Effort normal and breath sounds normal. No stridor. No respiratory distress. She has no wheezes. She has no rales.   Abdominal: Soft. Bowel sounds are normal. She exhibits no distension and no mass. There is no tenderness. There is no rebound and no guarding.   Musculoskeletal: She exhibits no edema.   Lymphadenopathy:     She has no cervical adenopathy.   Neurological: She is alert and oriented to person, place, and time. She exhibits normal muscle tone.   Skin: Skin is warm and dry. Capillary refill takes less than 2 seconds. No rash noted. She is not diaphoretic.   Psychiatric: She has a normal mood and affect.   Vitals reviewed.       Assessment and Plan: 23 y.o. female here for RPE.  Lung nodule  Keep follow-up CAT scan scheduled 10/16/2019.    Severe persistent asthma  Stable. Followed by Dr. Loyd.  On Symbicort, albuterol as needed and stay hist.  Recent PFTs.  Call for any recurrent shortness of breath, presyncope, perioral cyanosis etc. and/or frequent albuterol use.      Heart murmur  Murmur not appreciable today.  To keep cardiology follow-up in October.  Call for any recurrent palpitations, syncope etc.        Healthcare Maintenance:  Counseling provided on diet and nutrition, exercise, weight management, prevention of cardiac or vascular disease, mental health concerns, hypertension, diabetes, hyperlipidemia, allergic rhinitis, flu prevention and coronary atherosclerosis.    1.  Is fasting.  Check lipids and A1c  2.  Immunizations as above  3.  Pap smears up-to-date per GYN; need records  4.  Advise regular eye and dental exams    Return in about 6 months (around 2/13/2020) for Follow-up.    Pat Fields MD  8/13/2019

## 2019-08-13 ENCOUNTER — OFFICE VISIT (OUTPATIENT)
Dept: INTERNAL MEDICINE | Facility: CLINIC | Age: 24
End: 2019-08-13

## 2019-08-13 VITALS
TEMPERATURE: 97.9 F | WEIGHT: 176.4 LBS | SYSTOLIC BLOOD PRESSURE: 108 MMHG | HEART RATE: 65 BPM | HEIGHT: 65 IN | BODY MASS INDEX: 29.39 KG/M2 | OXYGEN SATURATION: 98 % | DIASTOLIC BLOOD PRESSURE: 68 MMHG | RESPIRATION RATE: 18 BRPM

## 2019-08-13 DIAGNOSIS — Z13.6 ENCOUNTER FOR LIPID SCREENING FOR CARDIOVASCULAR DISEASE: ICD-10-CM

## 2019-08-13 DIAGNOSIS — Z13.220 ENCOUNTER FOR LIPID SCREENING FOR CARDIOVASCULAR DISEASE: ICD-10-CM

## 2019-08-13 DIAGNOSIS — J45.50 SEVERE PERSISTENT ASTHMA, UNSPECIFIED WHETHER COMPLICATED: ICD-10-CM

## 2019-08-13 DIAGNOSIS — Z00.00 ENCOUNTER FOR WELL ADULT EXAM WITHOUT ABNORMAL FINDINGS: Primary | ICD-10-CM

## 2019-08-13 DIAGNOSIS — R91.1 LUNG NODULE: ICD-10-CM

## 2019-08-13 DIAGNOSIS — Z13.1 ENCOUNTER FOR SCREENING FOR DIABETES MELLITUS: ICD-10-CM

## 2019-08-13 DIAGNOSIS — R01.1 HEART MURMUR: ICD-10-CM

## 2019-08-13 LAB
CHOLEST SERPL-MCNC: 188 MG/DL (ref 0–200)
HBA1C MFR BLD: 5.3 % (ref 4.8–5.6)
HDLC SERPL-MCNC: 59 MG/DL (ref 40–60)
LDLC SERPL CALC-MCNC: 88 MG/DL (ref 0–100)
LDLC/HDLC SERPL: 1.48 {RATIO}
TRIGL SERPL-MCNC: 207 MG/DL (ref 0–150)
VLDLC SERPL-MCNC: 41.4 MG/DL (ref 5–40)

## 2019-08-13 PROCEDURE — 90632 HEPA VACCINE ADULT IM: CPT | Performed by: INTERNAL MEDICINE

## 2019-08-13 PROCEDURE — 90471 IMMUNIZATION ADMIN: CPT | Performed by: INTERNAL MEDICINE

## 2019-08-13 PROCEDURE — 90472 IMMUNIZATION ADMIN EACH ADD: CPT | Performed by: INTERNAL MEDICINE

## 2019-08-13 PROCEDURE — 99395 PREV VISIT EST AGE 18-39: CPT | Performed by: INTERNAL MEDICINE

## 2019-08-13 PROCEDURE — 90732 PPSV23 VACC 2 YRS+ SUBQ/IM: CPT | Performed by: INTERNAL MEDICINE

## 2019-08-13 PROCEDURE — 80061 LIPID PANEL: CPT | Performed by: INTERNAL MEDICINE

## 2019-08-13 PROCEDURE — 83036 HEMOGLOBIN GLYCOSYLATED A1C: CPT | Performed by: INTERNAL MEDICINE

## 2019-08-13 NOTE — ASSESSMENT & PLAN NOTE
Murmur not appreciable today.  To keep cardiology follow-up in October.  Call for any recurrent palpitations, syncope etc.

## 2019-08-13 NOTE — PATIENT INSTRUCTIONS
Preventive Care 18-39 Years, Female  Preventive care refers to lifestyle choices and visits with your health care provider that can promote health and wellness.  What does preventive care include?    · A yearly physical exam. This is also called an annual well check.  · Dental exams once or twice a year.  · Routine eye exams. Ask your health care provider how often you should have your eyes checked.  · Personal lifestyle choices, including:  ? Daily care of your teeth and gums.  ? Regular physical activity.  ? Eating a healthy diet.  ? Avoiding tobacco and drug use.  ? Limiting alcohol use.  ? Practicing safe sex.  ? Taking vitamin and mineral supplements as recommended by your health care provider.  What happens during an annual well check?  The services and screenings done by your health care provider during your annual well check will depend on your age, overall health, lifestyle risk factors, and family history of disease.  Counseling  Your health care provider may ask you questions about your:  · Alcohol use.  · Tobacco use.  · Drug use.  · Emotional well-being.  · Home and relationship well-being.  · Sexual activity.  · Eating habits.  · Work and work environment.  · Method of birth control.  · Menstrual cycle.  · Pregnancy history.  Screening  You may have the following tests or measurements:  · Height, weight, and BMI.  · Diabetes screening. This is done by checking your blood sugar (glucose) after you have not eaten for a while (fasting).  · Blood pressure.  · Lipid and cholesterol levels. These may be checked every 5 years starting at age 20.  · Skin check.  · Hepatitis C blood test.  · Hepatitis B blood test.  · Sexually transmitted disease (STD) testing.  · BRCA-related cancer screening. This may be done if you have a family history of breast, ovarian, tubal, or peritoneal cancers.  · Pelvic exam and Pap test. This may be done every 3 years starting at age 21. Starting at age 30, this may be done every 5  years if you have a Pap test in combination with an HPV test.  Discuss your test results, treatment options, and if necessary, the need for more tests with your health care provider.  Vaccines  Your health care provider may recommend certain vaccines, such as:  · Influenza vaccine. This is recommended every year.  · Tetanus, diphtheria, and acellular pertussis (Tdap, Td) vaccine. You may need a Td booster every 10 years.  · Varicella vaccine. You may need this if you have not been vaccinated.  · HPV vaccine. If you are 26 or younger, you may need three doses over 6 months.  · Measles, mumps, and rubella (MMR) vaccine. You may need at least one dose of MMR. You may also need a second dose.  · Pneumococcal 13-valent conjugate (PCV13) vaccine. You may need this if you have certain conditions and were not previously vaccinated.  · Pneumococcal polysaccharide (PPSV23) vaccine. You may need one or two doses if you smoke cigarettes or if you have certain conditions.  · Meningococcal vaccine. One dose is recommended if you are age 19-21 years and a first-year college student living in a residence htacker, or if you have one of several medical conditions. You may also need additional booster doses.  · Hepatitis A vaccine. You may need this if you have certain conditions or if you travel or work in places where you may be exposed to hepatitis A.  · Hepatitis B vaccine. You may need this if you have certain conditions or if you travel or work in places where you may be exposed to hepatitis B.  · Haemophilus influenzae type b (Hib) vaccine. You may need this if you have certain risk factors.  Talk to your health care provider about which screenings and vaccines you need and how often you need them.  This information is not intended to replace advice given to you by your health care provider. Make sure you discuss any questions you have with your health care provider.  Document Released: 02/13/2003 Document Revised: 07/31/2018  Document Reviewed: 10/18/2016  [employer] Interactive Patient Education © 2019 [employer]  Preventing Unhealthy Weight Gain, Adult  Staying at a healthy weight is important to your overall health. When fat builds up in your body, you may become overweight or obese. Being overweight or obese increases your risk of developing certain health problems, such as heart disease, diabetes, sleeping problems, joint problems, and some types of cancer.  Unhealthy weight gain is often the result of making unhealthy food choices or not getting enough exercise. You can make changes to your lifestyle to prevent obesity and stay as healthy as possible.  What nutrition changes can be made?    · Eat only as much as your body needs. To do this:  ? Pay attention to signs that you are hungry or full. Stop eating as soon as you feel full.  ? If you feel hungry, try drinking water first before eating. Drink enough water so your urine is clear or pale yellow.  ? Eat smaller portions. Pay attention to portion sizes when eating out.  ? Look at serving sizes on food labels. Most foods contain more than one serving per container.  ? Eat the recommended number of calories for your gender and activity level. For most active people, a daily total of 2,000 calories is appropriate. If you are trying to lose weight or are not very active, you may need to eat fewer calories. Talk with your health care provider or a diet and nutrition specialist (dietitian) about how many calories you need each day.  · Choose healthy foods, such as:  ? Fruits and vegetables. At each meal, try to fill at least half of your plate with fruits and vegetables.  ? Whole grains, such as whole-wheat bread, brown rice, and quinoa.  ? Lean meats, such as chicken or fish.  ? Other healthy proteins, such as beans, eggs, or tofu.  ? Healthy fats, such as nuts, seeds, fatty fish, and olive oil.  ? Low-fat or fat-free dairy products.  · Check food labels, and avoid food and drinks  that:  ? Are high in calories.  ? Have added sugar.  ? Are high in sodium.  ? Have saturated fats or trans fats.  · Cook foods in healthier ways, such as by baking, broiling, or grilling.  · Make a meal plan for the week, and shop with a grocery list to help you stay on track with your purchases. Try to avoid going to the grocery store when you are hungry.  · When grocery shopping, try to shop around the outside of the store first, where the fresh foods are. Doing this helps you to avoid prepackaged foods, which can be high in sugar, salt (sodium), and fat.  What lifestyle changes can be made?    · Exercise for 30 or more minutes on 5 or more days each week. Exercising may include brisk walking, yard work, biking, running, swimming, and team sports like basketball and soccer. Ask your health care provider which exercises are safe for you.  · Do muscle-strengthening activities, such as lifting weights or using resistance bands, on 2 or more days a week.  · Do not use any products that contain nicotine or tobacco, such as cigarettes and e-cigarettes. If you need help quitting, ask your health care provider.  · Limit alcohol intake to no more than 1 drink a day for nonpregnant women and 2 drinks a day for men. One drink equals 12 oz of beer, 5 oz of wine, or 1½ oz of hard liquor.  · Try to get 7-9 hours of sleep each night.  What other changes can be made?  · Keep a food and activity journal to keep track of:  ? What you ate and how many calories you had. Remember to count the calories in sauces, dressings, and side dishes.  ? Whether you were active, and what exercises you did.  ? Your calorie, weight, and activity goals.  · Check your weight regularly. Track any changes. If you notice you have gained weight, make changes to your diet or activity routine.  · Avoid taking weight-loss medicines or supplements. Talk to your health care provider before starting any new medicine or supplement.  · Talk to your health care  provider before trying any new diet or exercise plan.  Why are these changes important?  Eating healthy, staying active, and having healthy habits can help you to prevent obesity. Those changes also:  · Help you manage stress and emotions.  · Help you connect with friends and family.  · Improve your self-esteem.  · Improve your sleep.  · Prevent long-term health problems.  What can happen if changes are not made?  Being obese or overweight can cause you to develop joint or bone problems, which can make it hard for you to stay active or do activities you enjoy. Being obese or overweight also puts stress on your heart and lungs and can lead to health problems like diabetes, heart disease, and some cancers.  Where to find more information  Talk with your health care provider or a dietitian about healthy eating and healthy lifestyle choices. You may also find information from:  · U.[place]. Department of Agriculture, MyPlate: www.choosemyplate.gov  · American Heart Association: www.heart.org  · Centers for Disease Control and Prevention: www.cdc.gov  Summary  · Staying at a healthy weight is important to your overall health. It helps you to prevent certain diseases and health problems, such as heart disease, diabetes, joint problems, sleep disorders, and some types of cancer.  · Being obese or overweight can cause you to develop joint or bone problems, which can make it hard for you to stay active or do activities you enjoy.  · You can prevent unhealthy weight gain by eating a healthy diet, exercising regularly, not smoking, limiting alcohol, and getting enough sleep.  · Talk with your health care provider or a dietitian for guidance about healthy eating and healthy lifestyle choices.  This information is not intended to replace advice given to you by your health care provider. Make sure you discuss any questions you have with your health care provider.  Document Released: 12/19/2017 Document Revised: 09/28/2018 Document  Reviewed: 01/24/2018  [employer] Patient Education © 2019 [employer]

## 2019-08-13 NOTE — ASSESSMENT & PLAN NOTE
Stable. Followed by Dr. Loyd.  On Symbicort, albuterol as needed and stay hist.  Recent PFTs.  Call for any recurrent shortness of breath, presyncope, perioral cyanosis etc. and/or frequent albuterol use.

## 2019-10-16 ENCOUNTER — HOSPITAL ENCOUNTER (OUTPATIENT)
Dept: CT IMAGING | Facility: HOSPITAL | Age: 24
Discharge: HOME | End: 2019-10-16
Admitting: INTERNAL MEDICINE

## 2019-10-16 DIAGNOSIS — R91.1 LUNG NODULE: ICD-10-CM

## 2019-10-16 PROCEDURE — 71250 CT THORAX DX C-: CPT

## 2020-02-07 NOTE — PROGRESS NOTES
OFFICE PROGRESS NOTE    Chief Complaint   Patient presents with   • Asthma     6 month follow up     Last RPE 8/13/19    HPI: 24 y.o. female here for:    1. Asthma:  Follows with Dr. Loyd and is overdue for appt. On symbicort, albuterol PRN and Stayhist. Reports albuterol use 1x/day, usually at night on most days due to chest tightness. Has never been on Singulair.     2. Episode of near syncope an perioral cyanosis:  Occurred a few weeks prior to new pt visit 7/12/19 and has not reoccured. EKG with incomplete RBBB. CT PE negative but did have RUL nodule for which 3-6 month follow up was recommended. Chest x-ray was negative.  TTE 7/15/2019 showed LVEF 60%.  Trace aortic insufficiency.  Cannot rule out bicuspid aortic valve.  Subsequently patient underwent NELIDA on 7/24/2019 which showed EF of 56-60%, normal systolic function, normal aortic valve structure which is trileaflet.  Labs included a CBC, CMP, TSH, BNP, d-dimer which were all normal. PFTs were normal.     [facility] cardiology (Dr. Cornejo) on 7/19/2019 who ordered NELIDA as above.  If her near syncopal episodes return or worsen, plan is possibly for exercise stress test.  She was to have a 3-month follow-up which was scheduled 10/25/19 but pt cancelled.    Has not had another episode.      3. Lung Nodule:  RUL nodule as above. F/u scan scheduled 10/16/19 was stable and plan is for 1 year f/u.    Review of Systems   Constitutional: Negative for activity change, appetite change and fever.   HENT: Negative for congestion, sneezing and sore throat.    Eyes: Negative for discharge and visual disturbance.   Respiratory: Negative for cough and shortness of breath.    Cardiovascular: Negative for chest pain and palpitations.   Gastrointestinal: Negative for abdominal distention, abdominal pain, blood in stool, constipation, nausea and vomiting.   Endocrine: Negative for cold intolerance and heat intolerance.   Genitourinary: Negative for dysuria.   Musculoskeletal:  Negative for neck stiffness.   Skin: Negative for rash.   Allergic/Immunologic: Negative for environmental allergies and food allergies.   Neurological: Negative for headache.   Hematological: Negative for adenopathy.   Psychiatric/Behavioral: Negative for depressed mood.       The following portions of the patient's history were reviewed and updated as appropriate: allergies, current medications, past family history, past medical history, past social history, past surgical history and problem list.      Physical Exam:  Vitals:    02/14/20 1500   BP: 128/72   Pulse: 63   Resp: 16   Temp: 98 °F (36.7 °C)   TempSrc: Temporal   SpO2: 98%   Weight: 79.8 kg (176 lb)       Physical Exam   Constitutional: She is oriented to person, place, and time. She appears well-developed and well-nourished. No distress.   HENT:   Head: Normocephalic and atraumatic.   Right Ear: External ear normal.   Left Ear: External ear normal.   Mouth/Throat: Oropharynx is clear and moist. No oropharyngeal exudate.   Eyes: Conjunctivae are normal. Right eye exhibits no discharge. Left eye exhibits no discharge.   Neck: Normal range of motion. Neck supple.   Cardiovascular: Normal rate, regular rhythm and normal heart sounds. Exam reveals no gallop and no friction rub.   No murmur heard.  Pulmonary/Chest: Effort normal and breath sounds normal. No stridor. No respiratory distress. She has no wheezes. She has no rales.   Abdominal: Soft. Bowel sounds are normal. She exhibits no distension. There is no tenderness.   Musculoskeletal: She exhibits no edema.   Steady gait.   Neurological: She is alert and oriented to person, place, and time. She exhibits normal muscle tone.   Skin: Skin is warm and dry. Capillary refill takes less than 2 seconds. No rash noted. She is not diaphoretic.   Psychiatric: She has a normal mood and affect.   Vitals reviewed.    Assesment and Plan: 24 y.o. female here for:  Heart murmur  Resolved and given no further episodes of  perioral cyanosis etc and unremarkable work up today, ok to hold off on cardiology f/u unless new sx/concerns.    Lung nodule  Due for 1 year f/u CT scan 10/2020.    Severe persistent asthma  Due to frequent albuterol use, will add singulair 10mg qHS and c/w symbicort. To also schedule f/u with Dr. Loyd. Call for new SOB, wheezing etc.       Return in about 6 months (around 8/14/2020) for Annual physical (after 8/13/20).    Pat Fields MD  2/14/2020

## 2020-02-14 ENCOUNTER — OFFICE VISIT (OUTPATIENT)
Dept: INTERNAL MEDICINE | Facility: CLINIC | Age: 25
End: 2020-02-14

## 2020-02-14 VITALS
RESPIRATION RATE: 16 BRPM | TEMPERATURE: 98 F | OXYGEN SATURATION: 98 % | HEART RATE: 63 BPM | DIASTOLIC BLOOD PRESSURE: 72 MMHG | BODY MASS INDEX: 29.29 KG/M2 | WEIGHT: 176 LBS | SYSTOLIC BLOOD PRESSURE: 128 MMHG

## 2020-02-14 DIAGNOSIS — R91.1 LUNG NODULE: ICD-10-CM

## 2020-02-14 DIAGNOSIS — R01.1 HEART MURMUR: ICD-10-CM

## 2020-02-14 DIAGNOSIS — J45.50 SEVERE PERSISTENT ASTHMA, UNSPECIFIED WHETHER COMPLICATED: Primary | ICD-10-CM

## 2020-02-14 PROCEDURE — 99214 OFFICE O/P EST MOD 30 MIN: CPT | Performed by: INTERNAL MEDICINE

## 2020-02-14 RX ORDER — MONTELUKAST SODIUM 10 MG/1
10 TABLET ORAL NIGHTLY
Qty: 90 TABLET | Refills: 1 | Status: SHIPPED | OUTPATIENT
Start: 2020-02-14 | End: 2021-01-05

## 2020-02-14 NOTE — ASSESSMENT & PLAN NOTE
Resolved and given no further episodes of perioral cyanosis etc and unremarkable work up today, ok to hold off on cardiology f/u unless new sx/concerns.

## 2020-10-07 ENCOUNTER — OFFICE VISIT (OUTPATIENT)
Dept: INTERNAL MEDICINE | Facility: CLINIC | Age: 25
End: 2020-10-07

## 2020-10-07 VITALS
WEIGHT: 175 LBS | HEIGHT: 65 IN | BODY MASS INDEX: 29.16 KG/M2 | RESPIRATION RATE: 18 BRPM | OXYGEN SATURATION: 99 % | DIASTOLIC BLOOD PRESSURE: 72 MMHG | HEART RATE: 76 BPM | SYSTOLIC BLOOD PRESSURE: 120 MMHG | TEMPERATURE: 97.5 F

## 2020-10-07 DIAGNOSIS — J45.50 SEVERE PERSISTENT ASTHMA, UNSPECIFIED WHETHER COMPLICATED: ICD-10-CM

## 2020-10-07 DIAGNOSIS — Z13.6 ENCOUNTER FOR LIPID SCREENING FOR CARDIOVASCULAR DISEASE: ICD-10-CM

## 2020-10-07 DIAGNOSIS — Z11.59 ENCOUNTER FOR HEPATITIS C SCREENING TEST FOR LOW RISK PATIENT: ICD-10-CM

## 2020-10-07 DIAGNOSIS — Z13.220 ENCOUNTER FOR LIPID SCREENING FOR CARDIOVASCULAR DISEASE: ICD-10-CM

## 2020-10-07 DIAGNOSIS — R91.1 LUNG NODULE: ICD-10-CM

## 2020-10-07 DIAGNOSIS — Z00.01 ENCOUNTER FOR WELL ADULT EXAM WITH ABNORMAL FINDINGS: Primary | ICD-10-CM

## 2020-10-07 DIAGNOSIS — R21 RASH: ICD-10-CM

## 2020-10-07 DIAGNOSIS — R42 LIGHTHEADEDNESS: ICD-10-CM

## 2020-10-07 PROBLEM — Q23.1 AORTIC INSUFFICIENCY DUE TO BICUSPID AORTIC VALVE: Status: RESOLVED | Noted: 2019-07-19 | Resolved: 2020-10-07

## 2020-10-07 PROBLEM — Q23.81 AORTIC INSUFFICIENCY DUE TO BICUSPID AORTIC VALVE: Status: RESOLVED | Noted: 2019-07-19 | Resolved: 2020-10-07

## 2020-10-07 PROBLEM — R01.1 HEART MURMUR: Status: RESOLVED | Noted: 2019-07-12 | Resolved: 2020-10-07

## 2020-10-07 PROCEDURE — 90686 IIV4 VACC NO PRSV 0.5 ML IM: CPT | Performed by: INTERNAL MEDICINE

## 2020-10-07 PROCEDURE — 99395 PREV VISIT EST AGE 18-39: CPT | Performed by: INTERNAL MEDICINE

## 2020-10-07 PROCEDURE — 90471 IMMUNIZATION ADMIN: CPT | Performed by: INTERNAL MEDICINE

## 2020-10-07 PROCEDURE — 80053 COMPREHEN METABOLIC PANEL: CPT | Performed by: INTERNAL MEDICINE

## 2020-10-07 PROCEDURE — 80061 LIPID PANEL: CPT | Performed by: INTERNAL MEDICINE

## 2020-10-07 PROCEDURE — 85027 COMPLETE CBC AUTOMATED: CPT | Performed by: INTERNAL MEDICINE

## 2020-10-07 PROCEDURE — 90632 HEPA VACCINE ADULT IM: CPT | Performed by: INTERNAL MEDICINE

## 2020-10-07 PROCEDURE — 86803 HEPATITIS C AB TEST: CPT | Performed by: INTERNAL MEDICINE

## 2020-10-07 PROCEDURE — 36415 COLL VENOUS BLD VENIPUNCTURE: CPT | Performed by: INTERNAL MEDICINE

## 2020-10-07 PROCEDURE — 90472 IMMUNIZATION ADMIN EACH ADD: CPT | Performed by: INTERNAL MEDICINE

## 2020-10-07 NOTE — ASSESSMENT & PLAN NOTE
Due to recurrent episodes as above although not in several months, have recommended she call Dr. Cornejo for follow-up as per last cardiology note patient may need stress test.  In the future have advised patient that if she has recurrent symptoms she needs to call me that day for evaluation.  Certainly if there is recurrent perioral cyanosis, actual LOC, new chest pain, new shortness of breath she needs immediate/emergency eval to rule out other life-threatening condition such as PE, ACS etc.

## 2020-10-07 NOTE — ASSESSMENT & PLAN NOTE
Currently asymptomatic on exam.  Discussed could be hand-foot-and-mouth but patient does not have any other systemic symptoms so less likely.  Could be some type of eczema like dyshidrotic eczema.  Certainly anytime there is a palmar rash would need to consider syphilis although patient denies any concerns about this and by the description she provides, rash seems atypical for this.  Have advised patient to take a picture and/or schedule follow-up on the day she has a rash so I can evaluate.

## 2020-10-07 NOTE — PROGRESS NOTES
Adult RPE:  Patient Care Team:  Pat Fields MD as PCP - General (Internal Medicine)    Chief Complaint   Patient presents with   • Annual Exam       Prachi Bailey is a 25 y.o. female who presents for her yearly physical exam.     HPI Issues discussed today:    1. Asthma:  Follows with Dr. Loyd. On symbicort, albuterol PRN, Singulair and Stayhist.  Rare albuterol use.     2. Episode of near syncope and perioral cyanosis:  Occurred a few weeks prior to new pt visit 7/12/19 and has not reoccured. EKG with incomplete RBBB. CT PE negative but did have RUL nodule for which 3-6 month follow up was recommended. Chest x-ray was negative.  TTE 7/15/2019 showed LVEF 60%.  Trace aortic insufficiency.  Cannot rule out bicuspid aortic valve.  Subsequently patient underwent NELIDA on 7/24/2019 which showed EF of 56-60%, normal systolic function, normal aortic valve structure which is trileaflet.  Labs included a CBC, CMP, TSH, BNP, d-dimer which were all normal. PFTs were normal.      saw cardiology (Dr. Cornejo) on 7/19/2019 who ordered NELIDA as above.  If her near syncopal episodes return or worsen, plan is possibly for exercise stress test.  She was to have a 3-month follow-up which was scheduled 10/25/19 but pt cancelled as she had no further symptoms and was feeling well.     A few months ago patient had 2 episodes similar to above with dizziness, lightheadedness, feeling out of breath and feeling like she was going to pass out although she did not actually have LOC.  She also did not have any perioral cyanosis at that time.  She did not call this office to notify us nor notify any of her other providers.  She did not seek medical care for this.  Has not happened since.  She does not have any exercise intolerance, chest pain, palpitations, exercise intolerance, leg swelling, PND.     3. Lung Nodule:  RUL nodule as above. F/u scan scheduled 10/16/19 was stable and plan is for 1 year f/u.  Patient does not have  this  scheduled yet.    4.  Rash:  It is occurred intermittently in the last month or so to her palms.  It is a pinpoint red rash which feels like pinpricks.  Self resolves.  No new soaps or lotions.  Made patient think of hand-foot-and-mouth disease which her daughter had last year.  Daughter is currently well.  Patient's not had any other symptoms like fever/chills, runny nose/cough.  She does not have a rash anywhere else on her body and she does not currently have the rash today.    Review of Systems   Constitutional: Negative for activity change, appetite change and fever.   HENT: Negative for congestion, sneezing and sore throat.    Eyes: Negative for discharge and visual disturbance.   Respiratory: Negative for cough and shortness of breath.    Cardiovascular: Negative for chest pain and palpitations.   Gastrointestinal: Negative for abdominal distention, abdominal pain, blood in stool, constipation, nausea and vomiting.   Endocrine: Negative for cold intolerance and heat intolerance.   Genitourinary: Negative for dysuria.   Musculoskeletal: Negative for neck stiffness.   Skin: Negative for rash.   Allergic/Immunologic: Negative for environmental allergies and food allergies.   Neurological: Negative for headache.   Hematological: Negative for adenopathy.   Psychiatric/Behavioral: Negative for depressed mood.        History  Past Medical History:   Diagnosis Date   • Abnormal ECG 07/12/18   • Aortic insufficiency due to bicuspid aortic valve 7/19/2019   • Asthma    • Heart murmur 07/12/19   • Heart murmur       History reviewed. No pertinent surgical history.   No Known Allergies   Family History   Problem Relation Age of Onset   • Asthma Father    • Hypertension Father    • Hyperlipidemia Father    • Heart attack Maternal Uncle    • Heart attack Paternal Grandfather    • Stroke Paternal Grandfather    • Hyperlipidemia Paternal Grandfather    • Hypertension Paternal Grandfather    • Asthma Sister       Social  History     Socioeconomic History   • Marital status:      Spouse name: Not on file   • Number of children: Not on file   • Years of education: Not on file   • Highest education level: Not on file   Tobacco Use   • Smoking status: Never Smoker   • Smokeless tobacco: Never Used   Substance and Sexual Activity   • Alcohol use: Yes     Alcohol/week: 1.0 standard drinks     Types: 1 Glasses of wine per week   • Drug use: No   • Sexual activity: Yes     Partners: Male     Birth control/protection: OCP        Current Outpatient Medications:   •  Albuterol (VENTOLIN IN), Inhale As Needed., Disp: , Rfl:   •  albuterol sulfate  (90 Base) MCG/ACT inhaler, Inhale 2 puffs Every 4 (Four) Hours As Needed for Wheezing., Disp: , Rfl:   •  drospirenone-ethinyl estradiol (RADHA,GIANVI) 3-0.02 MG per tablet, Take  by mouth Daily., Disp: , Rfl: 0  •  montelukast (SINGULAIR) 10 MG tablet, Take 1 tablet by mouth Every Night., Disp: 90 tablet, Rfl: 1  •  STAHIST AD 25-60 MG tablet, TK 1 T PO Q 12 HOURS PRN, Disp: , Rfl: 4  •  SYMBICORT 160-4.5 MCG/ACT inhaler, , Disp: , Rfl:   •  valACYclovir (VALTREX) 500 MG tablet, Take 500 mg by mouth Daily., Disp: , Rfl: 3    Health Maintenance   Topic Date Due   • HEPATITIS C SCREENING  06/28/2019   • INFLUENZA VACCINE  08/01/2020   • ANNUAL PHYSICAL  08/14/2020   • PAP SMEAR  09/09/2022   • TDAP/TD VACCINES (3 - Td) 03/18/2029   • Pneumococcal Vaccine 0-64  Completed       Immunizations  Td/Tdap(Booster Q 10 yrs): Up-to-date.  Pneumovax (1 yr after Prevnar):  UTD  Hep A:  Dose #2 advised and administered.  Flu: Advised and administered.  Immunization History   Administered Date(s) Administered   • Flulaval/Fluarix Quad 09/28/2018, 10/07/2020   • Hepatitis A 08/13/2019, 10/07/2020   • MMR 03/18/2019   • Pneumococcal Polysaccharide (PPSV23) 08/13/2019   • Tdap 01/11/2019, 03/18/2019     Hemoglobin A1C   Date Value Ref Range Status   08/13/2019 5.30 4.80 - 5.60 % Final     Lab Results  "  Component Value Date    CHOL 188 08/13/2019    TRIG 207 (H) 08/13/2019    HDL 59 08/13/2019    LDL 88 08/13/2019       Patient's Body mass index is 29.12 kg/m². BMI is above normal parameters. Recommendations include: educational material, exercise counseling and nutrition counseling.      Colorectal Screening:  N/A  Pap: Always annually with Karey Padilla.  Mammogram:  N/A  PSA(Over age 50):  N/A  US Aorta (For male smokers, age 65):  N/A  CT for Smoker (Age 55-75, 30pk yr):  N/A  Bone Density/DEXA:  N/A  Hep C: Screen today per guidelines.    Vitals:    10/07/20 1434   BP: 120/72   BP Location: Right arm   Patient Position: Sitting   Cuff Size: Adult   Pulse: 76   Resp: 18   Temp: 97.5 °F (36.4 °C)   TempSrc: Temporal   SpO2: 99%   Weight: 79.4 kg (175 lb)   Height: 165.1 cm (65\")   PainSc: 0-No pain         Physical Exam  Vitals signs reviewed.   Constitutional:       General: She is not in acute distress.     Appearance: She is well-developed. She is not ill-appearing, toxic-appearing or diaphoretic.   HENT:      Head: Normocephalic and atraumatic.      Right Ear: Tympanic membrane and external ear normal.      Left Ear: Tympanic membrane and external ear normal.      Nose: Nose normal.      Mouth/Throat:      Mouth: Mucous membranes are moist.      Pharynx: No oropharyngeal exudate.   Eyes:      General: No scleral icterus.        Right eye: No discharge.         Left eye: No discharge.      Conjunctiva/sclera: Conjunctivae normal.      Pupils: Pupils are equal, round, and reactive to light.   Neck:      Musculoskeletal: Normal range of motion and neck supple.      Thyroid: No thyromegaly.      Vascular: No JVD.      Trachea: No tracheal deviation.   Cardiovascular:      Rate and Rhythm: Normal rate and regular rhythm.      Heart sounds: Normal heart sounds. No murmur. No friction rub. No gallop.    Pulmonary:      Effort: Pulmonary effort is normal. No respiratory distress.      Breath sounds: Normal breath " sounds. No stridor. No wheezing, rhonchi or rales.   Abdominal:      General: Bowel sounds are normal. There is no distension.      Palpations: Abdomen is soft. There is no mass.      Tenderness: There is no abdominal tenderness. There is no guarding or rebound.      Hernia: No hernia is present.   Musculoskeletal:      Right lower leg: No edema.      Left lower leg: No edema.   Lymphadenopathy:      Cervical: No cervical adenopathy.   Skin:     General: Skin is warm and dry.      Capillary Refill: Capillary refill takes less than 2 seconds.      Findings: No rash.   Neurological:      Mental Status: She is alert and oriented to person, place, and time.      Motor: No abnormal muscle tone.   Psychiatric:         Mood and Affect: Mood normal.          Assessment and Plan: 25 y.o. female here for RPE.  Severe persistent asthma  Stable.  Continue Symbicort, Singulair, albuterol, stay hist.  Follow-up with Dr. Loyd as scheduled.        Lung nodule  Ordered 1 year follow-up chest CT.    Lightheadedness  Due to recurrent episodes as above although not in several months, have recommended she call Dr. Cornejo for follow-up as per last cardiology note patient may need stress test.  In the future have advised patient that if she has recurrent symptoms she needs to call me that day for evaluation.  Certainly if there is recurrent perioral cyanosis, actual LOC, new chest pain, new shortness of breath she needs immediate/emergency eval to rule out other life-threatening condition such as PE, ACS etc.    Rash  Currently asymptomatic on exam.  Discussed could be hand-foot-and-mouth but patient does not have any other systemic symptoms so less likely.  Could be some type of eczema like dyshidrotic eczema.  Certainly anytime there is a palmar rash would need to consider syphilis although patient denies any concerns about this and by the description she provides, rash seems atypical for this.  Have advised patient to take a picture  and/or schedule follow-up on the day she has a rash so I can evaluate.    Healthcare Maintenance:  Counseling provided on diet and nutrition, exercise, weight management, prevention of cardiac or vascular disease, the relationship between weight and GERD, tobacco cessation, alcohol use, supplements, mental health concerns, insomnia, hypertension, diabetes, hyperlipidemia, anxiety, allergic rhinitis, sinusitis, flu prevention, asthma, bronchitis and coronary atherosclerosis.    1.  Fasting CBC, CMP, lipids, hep C antibody today.  2.  Immunizations as above  3.  Pap smear up-to-date per GYN  4.  Advise regular eye and dental exams.    Return in about 1 year (around 10/7/2021) for Annual physical, As needed if no improvement or new symptoms.    Pat Fields MD  10/7/2020

## 2020-10-07 NOTE — ASSESSMENT & PLAN NOTE
Stable.  Continue Symbicort, Singulair, albuterol, stay hist.  Follow-up with Dr. Loyd as scheduled.

## 2020-10-08 LAB
ALBUMIN SERPL-MCNC: 4.5 G/DL (ref 3.5–5.2)
ALBUMIN/GLOB SERPL: 1.7 G/DL
ALP SERPL-CCNC: 47 U/L (ref 39–117)
ALT SERPL W P-5'-P-CCNC: 16 U/L (ref 1–33)
ANION GAP SERPL CALCULATED.3IONS-SCNC: 14 MMOL/L (ref 5–15)
AST SERPL-CCNC: 16 U/L (ref 1–32)
BILIRUB SERPL-MCNC: 0.6 MG/DL (ref 0–1.2)
BUN SERPL-MCNC: 10 MG/DL (ref 6–20)
BUN/CREAT SERPL: 11.6 (ref 7–25)
CALCIUM SPEC-SCNC: 9.7 MG/DL (ref 8.6–10.5)
CHLORIDE SERPL-SCNC: 102 MMOL/L (ref 98–107)
CHOLEST SERPL-MCNC: 174 MG/DL (ref 0–200)
CO2 SERPL-SCNC: 21 MMOL/L (ref 22–29)
CREAT SERPL-MCNC: 0.86 MG/DL (ref 0.57–1)
DEPRECATED RDW RBC AUTO: 38.6 FL (ref 37–54)
ERYTHROCYTE [DISTWIDTH] IN BLOOD BY AUTOMATED COUNT: 11.9 % (ref 12.3–15.4)
GFR SERPL CREATININE-BSD FRML MDRD: 79 ML/MIN/1.73
GLOBULIN UR ELPH-MCNC: 2.7 GM/DL
GLUCOSE SERPL-MCNC: 81 MG/DL (ref 65–99)
HCT VFR BLD AUTO: 37.1 % (ref 34–46.6)
HCV AB SER DONR QL: NORMAL
HDLC SERPL-MCNC: 62 MG/DL (ref 40–60)
HGB BLD-MCNC: 12.4 G/DL (ref 12–15.9)
LDLC SERPL CALC-MCNC: 85 MG/DL (ref 0–100)
LDLC/HDLC SERPL: 1.37 {RATIO}
MCH RBC QN AUTO: 30 PG (ref 26.6–33)
MCHC RBC AUTO-ENTMCNC: 33.4 G/DL (ref 31.5–35.7)
MCV RBC AUTO: 89.6 FL (ref 79–97)
PLATELET # BLD AUTO: 247 10*3/MM3 (ref 140–450)
PMV BLD AUTO: 12 FL (ref 6–12)
POTASSIUM SERPL-SCNC: 3.9 MMOL/L (ref 3.5–5.2)
PROT SERPL-MCNC: 7.2 G/DL (ref 6–8.5)
RBC # BLD AUTO: 4.14 10*6/MM3 (ref 3.77–5.28)
SODIUM SERPL-SCNC: 137 MMOL/L (ref 136–145)
TRIGL SERPL-MCNC: 136 MG/DL (ref 0–150)
VLDLC SERPL-MCNC: 27.2 MG/DL (ref 5–40)
WBC # BLD AUTO: 8.16 10*3/MM3 (ref 3.4–10.8)

## 2020-10-12 ENCOUNTER — HOSPITAL ENCOUNTER (OUTPATIENT)
Dept: CT IMAGING | Facility: HOSPITAL | Age: 25
Discharge: HOME | End: 2020-10-12
Admitting: INTERNAL MEDICINE

## 2020-10-12 DIAGNOSIS — R91.1 LUNG NODULE: ICD-10-CM

## 2020-10-12 PROCEDURE — 71250 CT THORAX DX C-: CPT

## 2020-11-04 PROBLEM — R55 NEAR SYNCOPE: Status: ACTIVE | Noted: 2020-11-04

## 2020-11-04 NOTE — PROGRESS NOTES
OFFICE VISIT  NOTE  [facility] CARDIOLOGY      Name: Prachi Bailey    Date: 2020  MRN:  [id number]  :  1995      REFERRING/PRIMARY PROVIDER:  Pat Fields MD    Chief Complaint   Patient presents with   • Dizziness   • Heart Murmur       HPI: Prachi Bailey is a 25 y.o. female who presents today for new follow-up for murmur, and possible bicuspid aortic valve.  Follow-up NELIDA showed normal aortic valve, trileaflet no evidence of stenosis.  She presents back today with 2 episodes of near syncope, occurred 2 months ago, the middle the night when she was getting up to change her daughter's diaper, she felt weak, lightheaded, dizzy, sat down and lay down and sensation went away after 5 minutes, she also felt a pounding sensation in her chest and heart racing but away quickly.  She is very active, no further symptoms since that time.  Denies chest pain or shortness of breath.    Past Medical History:   Diagnosis Date   • Abnormal ECG 18   • Aortic insufficiency due to bicuspid aortic valve 2019   • Asthma    • Heart murmur 19   • Heart murmur        History reviewed. No pertinent surgical history.    Social History     Socioeconomic History   • Marital status:      Spouse name: Not on file   • Number of children: Not on file   • Years of education: Not on file   • Highest education level: Not on file   Tobacco Use   • Smoking status: Never Smoker   • Smokeless tobacco: Never Used   Substance and Sexual Activity   • Alcohol use: Yes     Alcohol/week: 1.0 standard drinks     Types: 1 Glasses of wine per week   • Drug use: No   • Sexual activity: Yes     Partners: Male     Birth control/protection: OCP       Family History   Problem Relation Age of Onset   • Asthma Father    • Hypertension Father    • Hyperlipidemia Father    • Heart attack Maternal Uncle    • Heart attack Paternal Grandfather    • Stroke Paternal Grandfather    • Hyperlipidemia  "Paternal Grandfather    • Hypertension Paternal Grandfather    • Asthma Sister         ROS:   Constitutional no fever,  no weight loss   Skin no rash, no subcutaneous nodules   Otolaryngeal no difficulty swallowing   Cardiovascular See HPI   Pulmonary no cough, no sputum production   Gastrointestinal no constipation, no diarrhea   Genitourinary no dysuria, no hematuria   Hematologic no easy bruisability, no abnormal bleeding   Musculoskeletal no muscle pain   Neurologic no dizziness, no falls         No Known Allergies      Current Outpatient Medications:   •  Albuterol (VENTOLIN IN), Inhale As Needed., Disp: , Rfl:   •  albuterol sulfate  (90 Base) MCG/ACT inhaler, Inhale 2 puffs Every 4 (Four) Hours As Needed for Wheezing., Disp: , Rfl:   •  montelukast (SINGULAIR) 10 MG tablet, Take 1 tablet by mouth Every Night., Disp: 90 tablet, Rfl: 1  •  STAHIST AD 25-60 MG tablet, TK 1 T PO Q 12 HOURS PRN, Disp: , Rfl: 4  •  SYMBICORT 160-4.5 MCG/ACT inhaler, Inhale 2 puffs 2 (Two) Times a Day., Disp: , Rfl:   •  valACYclovir (VALTREX) 500 MG tablet, Take 500 mg by mouth Daily As Needed., Disp: , Rfl: 3    Vitals:    11/06/20 1131   BP: 114/78   BP Location: Right arm   Patient Position: Sitting   Cuff Size: Adult   Pulse: 86   Temp: 97.7 °F (36.5 °C)   SpO2: 99%   Weight: 77.6 kg (171 lb)   Height: 165.1 cm (65\")     Body mass index is 28.46 kg/m².    PHYSICAL EXAM:    General Appearance:   · well developed  · well nourished  HENT:   · oropharynx moist  · lips not cyanotic  Neck:  · thyroid not enlarged  · supple  Respiratory:  · no respiratory distress  · normal breath sounds  · no rales  Cardiovascular:  · no jugular venous distention  · regular rhythm  · apical impulse normal  · S1 normal, S2 normal  · no S3, no S4   · 2/6 systolic murmur, best heard at the sternal border.  · no rub, no thrill  · carotid pulses normal; no bruit  · pedal pulses normal  · lower extremity edema: none    Gastrointestinal:   · bowel " sounds normal  · non-tender  · no hepatomegaly, no splenomegaly  Musculoskeletal:  · no clubbing of fingers.   · normocephalic, head atraumatic  Skin:   · warm, dry  Psychiatric:  · judgement and insight appropriate  · normal mood and affect    RESULTS:   Procedures    Results for orders placed during the hospital encounter of 07/24/19   Adult Transesophageal Echo (NELIDA) W/ Cont if Necessary Per Protocol    Narrative · Estimated EF appears to be in the range of 56 - 60%.  · Left ventricular systolic function is normal.  · The aortic valve is structurally normal. The valve appears trileaflet.                  Labs:  Lab Results   Component Value Date    CHOL 174 10/07/2020    TRIG 136 10/07/2020    HDL 62 (H) 10/07/2020    LDL 85 10/07/2020    AST 16 10/07/2020    ALT 16 10/07/2020     Lab Results   Component Value Date    HGBA1C 5.30 08/13/2019     No components found for: CREATINININE  eGFR Non  Amer   Date Value Ref Range Status   10/07/2020 79 >60 mL/min/1.73 Final   07/12/2019 67 >60 mL/min/1.73 Final         ASSESSMENT:  Problem List Items Addressed This Visit        Cardiovascular and Mediastinum    Cardiac murmur    Overview     7/24/19 NELIDA  · LVEF 56 - 60%.  · The aortic valve is structurally normal. The valve appears trileaflet.    7/15/19 Echo  · LVEF = 60%.  · Trace aortic insufficiency. Cannot rule out bicuspid aortic valve.         Near syncope - Primary       Respiratory    Severe persistent asthma          PLAN:    1.  Cardiac murmur:  Likely functional, normal aortic valve on NELIDA 7/2019    2.  Near syncope:  14-day Holter monitor  Repeat transthoracic echo      3.   Severe persistent asthma:  Continue Symbicort and albuterol as needed.    Return to clinic in 4 months    Thank you for the opportunity to share in the care of your patient; please do not hesitate to call me with any questions.     Michael Cornejo MD, [facility]  Office: [phone]  [facility] [address]  [address]

## 2020-11-06 ENCOUNTER — OFFICE VISIT (OUTPATIENT)
Dept: CARDIOLOGY | Facility: CLINIC | Age: 25
End: 2020-11-06

## 2020-11-06 VITALS
SYSTOLIC BLOOD PRESSURE: 114 MMHG | BODY MASS INDEX: 28.49 KG/M2 | HEART RATE: 86 BPM | WEIGHT: 171 LBS | DIASTOLIC BLOOD PRESSURE: 78 MMHG | OXYGEN SATURATION: 99 % | HEIGHT: 65 IN | TEMPERATURE: 97.7 F

## 2020-11-06 DIAGNOSIS — R55 NEAR SYNCOPE: Primary | ICD-10-CM

## 2020-11-06 DIAGNOSIS — J45.50 SEVERE PERSISTENT ASTHMA, UNSPECIFIED WHETHER COMPLICATED: ICD-10-CM

## 2020-11-06 DIAGNOSIS — R01.1 CARDIAC MURMUR: ICD-10-CM

## 2020-11-06 PROCEDURE — 99214 OFFICE O/P EST MOD 30 MIN: CPT | Performed by: INTERNAL MEDICINE

## 2020-11-21 ENCOUNTER — PATIENT MESSAGE (OUTPATIENT)
Dept: INTERNAL MEDICINE | Facility: CLINIC | Age: 25
End: 2020-11-21

## 2020-11-23 NOTE — TELEPHONE ENCOUNTER
From: Prachi Bailey  To: Pat Fields MD  Sent: 11/21/2020 1:31 PM EST  Subject: Non-Urgent Medical Question    Hi Dr. Fields, I just wanted to let you know I have tested positive for COVID.

## 2020-12-04 ENCOUNTER — TELEPHONE (OUTPATIENT)
Dept: CARDIOLOGY | Facility: CLINIC | Age: 25
End: 2020-12-04

## 2020-12-04 DIAGNOSIS — I44.1 AV BLOCK, MOBITZ II: ICD-10-CM

## 2020-12-04 DIAGNOSIS — R55 NEAR SYNCOPE: Primary | ICD-10-CM

## 2020-12-04 NOTE — TELEPHONE ENCOUNTER
----- Message from Michael Cornejo MD sent at 12/4/2020  8:55 AM EST -----  Please inform the patient of their test results.  Please refer to EP due to near syncope and secondary AV block.  Thank you.

## 2020-12-04 NOTE — TELEPHONE ENCOUNTER
Spoke with pt regarding monitor results and plan to see EP. She is agreeable and verbalized understanding.

## 2020-12-07 ENCOUNTER — HOSPITAL ENCOUNTER (OUTPATIENT)
Dept: CARDIOLOGY | Facility: HOSPITAL | Age: 25
Discharge: HOME | End: 2020-12-07
Admitting: INTERNAL MEDICINE

## 2020-12-07 VITALS — WEIGHT: 171 LBS | HEIGHT: 65 IN | BODY MASS INDEX: 28.49 KG/M2

## 2020-12-07 DIAGNOSIS — R55 NEAR SYNCOPE: ICD-10-CM

## 2020-12-07 PROCEDURE — 93306 TTE W/DOPPLER COMPLETE: CPT

## 2020-12-07 PROCEDURE — 93306 TTE W/DOPPLER COMPLETE: CPT | Performed by: INTERNAL MEDICINE

## 2020-12-08 ENCOUNTER — TELEPHONE (OUTPATIENT)
Dept: CARDIOLOGY | Facility: CLINIC | Age: 25
End: 2020-12-08

## 2020-12-08 ENCOUNTER — TELEPHONE (OUTPATIENT)
Dept: INTERNAL MEDICINE | Facility: CLINIC | Age: 25
End: 2020-12-08

## 2020-12-08 LAB
BH CV ECHO MEAS - AO ROOT AREA (BSA CORRECTED): 1.6
BH CV ECHO MEAS - AO ROOT AREA: 7.1 CM^2
BH CV ECHO MEAS - AO ROOT DIAM: 3 CM
BH CV ECHO MEAS - BSA(HAYCOCK): 1.9 M^2
BH CV ECHO MEAS - BSA: 1.8 M^2
BH CV ECHO MEAS - BZI_BMI: 28.5 KILOGRAMS/M^2
BH CV ECHO MEAS - BZI_METRIC_HEIGHT: 165 CM
BH CV ECHO MEAS - BZI_METRIC_WEIGHT: 77.6 KG
BH CV ECHO MEAS - EDV(CUBED): 112.6 ML
BH CV ECHO MEAS - EDV(MOD-SP2): 79.9 ML
BH CV ECHO MEAS - EDV(MOD-SP4): 105 ML
BH CV ECHO MEAS - EDV(TEICH): 109 ML
BH CV ECHO MEAS - EF(CUBED): 74.2 %
BH CV ECHO MEAS - EF(MOD-BP): 50.9 %
BH CV ECHO MEAS - EF(MOD-SP2): 49.4 %
BH CV ECHO MEAS - EF(MOD-SP4): 47.1 %
BH CV ECHO MEAS - EF(TEICH): 66 %
BH CV ECHO MEAS - ESV(CUBED): 29 ML
BH CV ECHO MEAS - ESV(MOD-SP2): 40.4 ML
BH CV ECHO MEAS - ESV(MOD-SP4): 55.5 ML
BH CV ECHO MEAS - ESV(TEICH): 37.1 ML
BH CV ECHO MEAS - FS: 36.4 %
BH CV ECHO MEAS - IVS/LVPW: 0.76
BH CV ECHO MEAS - IVSD: 0.74 CM
BH CV ECHO MEAS - LA DIMENSION: 3 CM
BH CV ECHO MEAS - LA/AO: 1
BH CV ECHO MEAS - LAD MAJOR: 5.4 CM
BH CV ECHO MEAS - LAT PEAK E' VEL: 15.8 CM/SEC
BH CV ECHO MEAS - LATERAL E/E' RATIO: 5.5
BH CV ECHO MEAS - LV DIASTOLIC VOL/BSA (35-75): 56.8 ML/M^2
BH CV ECHO MEAS - LV MASS(C)D: 139.3 GRAMS
BH CV ECHO MEAS - LV MASS(C)DI: 75.3 GRAMS/M^2
BH CV ECHO MEAS - LV MAX PG: 5.6 MMHG
BH CV ECHO MEAS - LV MEAN PG: 2.3 MMHG
BH CV ECHO MEAS - LV SYSTOLIC VOL/BSA (12-30): 30 ML/M^2
BH CV ECHO MEAS - LV V1 MAX: 118.1 CM/SEC
BH CV ECHO MEAS - LV V1 MEAN: 65.7 CM/SEC
BH CV ECHO MEAS - LV V1 VTI: 24.8 CM
BH CV ECHO MEAS - LVIDD: 4.8 CM
BH CV ECHO MEAS - LVIDS: 3.1 CM
BH CV ECHO MEAS - LVLD AP2: 7.7 CM
BH CV ECHO MEAS - LVLD AP4: 8.6 CM
BH CV ECHO MEAS - LVLS AP2: 7.3 CM
BH CV ECHO MEAS - LVLS AP4: 7.1 CM
BH CV ECHO MEAS - LVOT AREA (M): 3.1 CM^2
BH CV ECHO MEAS - LVOT AREA: 3.2 CM^2
BH CV ECHO MEAS - LVOT DIAM: 2 CM
BH CV ECHO MEAS - LVPWD: 0.97 CM
BH CV ECHO MEAS - MED PEAK E' VEL: 10.2 CM/SEC
BH CV ECHO MEAS - MEDIAL E/E' RATIO: 8.5
BH CV ECHO MEAS - MV A MAX VEL: 61.8 CM/SEC
BH CV ECHO MEAS - MV DEC SLOPE: 339.5 CM/SEC^2
BH CV ECHO MEAS - MV DEC TIME: 0.22 SEC
BH CV ECHO MEAS - MV E MAX VEL: 86.3 CM/SEC
BH CV ECHO MEAS - MV E/A: 1.4
BH CV ECHO MEAS - MV P1/2T MAX VEL: 93.6 CM/SEC
BH CV ECHO MEAS - MV P1/2T: 80.7 MSEC
BH CV ECHO MEAS - MVA P1/2T LCG: 2.4 CM^2
BH CV ECHO MEAS - MVA(P1/2T): 2.7 CM^2
BH CV ECHO MEAS - PA ACC TIME: 0.14 SEC
BH CV ECHO MEAS - PA PR(ACCEL): 14 MMHG
BH CV ECHO MEAS - SI(CUBED): 45.2 ML/M^2
BH CV ECHO MEAS - SI(LVOT): 42.8 ML/M^2
BH CV ECHO MEAS - SI(MOD-SP2): 21.4 ML/M^2
BH CV ECHO MEAS - SI(MOD-SP4): 26.8 ML/M^2
BH CV ECHO MEAS - SI(TEICH): 38.9 ML/M^2
BH CV ECHO MEAS - SV(CUBED): 83.6 ML
BH CV ECHO MEAS - SV(LVOT): 79.1 ML
BH CV ECHO MEAS - SV(MOD-SP2): 39.5 ML
BH CV ECHO MEAS - SV(MOD-SP4): 49.5 ML
BH CV ECHO MEAS - SV(TEICH): 71.9 ML
BH CV ECHO MEAS - TAPSE (>1.6): 2.3 CM
BH CV ECHO MEASUREMENTS AVERAGE E/E' RATIO: 6.64
BH CV VAS BP LEFT ARM: NORMAL MMHG
BH CV XLRA - RV BASE: 3.6 CM
BH CV XLRA - RV LENGTH: 6.5 CM
BH CV XLRA - RV MID: 2.6 CM
BH CV XLRA - TDI S': 20.6 CM/SEC
LEFT ATRIUM VOLUME INDEX: 13.7 ML/M^2
LEFT ATRIUM VOLUME: 25.3 ML
MAXIMAL PREDICTED HEART RATE: 193 BPM
STRESS TARGET HR: 164 BPM

## 2020-12-08 RX ORDER — BUDESONIDE AND FORMOTEROL FUMARATE DIHYDRATE 160; 4.5 UG/1; UG/1
2 AEROSOL RESPIRATORY (INHALATION)
Qty: 10.2 G | Refills: 12 | Status: SHIPPED | OUTPATIENT
Start: 2020-12-08 | End: 2021-04-21 | Stop reason: ALTCHOICE

## 2020-12-08 NOTE — TELEPHONE ENCOUNTER
Spoke with pharmacy to verify if PA was needed. Symbicort 160-4.5 MCG/ACT inhaler is ready to be picked up. Pt was notified and verbalized good understanding.

## 2020-12-08 NOTE — TELEPHONE ENCOUNTER
----- Message from Michael Cornejo MD sent at 12/8/2020  8:06 AM EST -----  Please inform the patient of their test results. Thank you.

## 2020-12-08 NOTE — TELEPHONE ENCOUNTER
Patient called stating the pharmacy needs authorization so she can get refill of symbricort. She's experiencing shortness of breath, please advise.

## 2020-12-14 ENCOUNTER — CONSULT (OUTPATIENT)
Dept: CARDIOLOGY | Facility: CLINIC | Age: 25
End: 2020-12-14

## 2020-12-14 VITALS
TEMPERATURE: 97.8 F | OXYGEN SATURATION: 98 % | DIASTOLIC BLOOD PRESSURE: 72 MMHG | SYSTOLIC BLOOD PRESSURE: 110 MMHG | BODY MASS INDEX: 29.12 KG/M2 | HEIGHT: 65 IN | WEIGHT: 174.8 LBS | HEART RATE: 60 BPM

## 2020-12-14 DIAGNOSIS — R42 DIZZINESS: ICD-10-CM

## 2020-12-14 DIAGNOSIS — R55 NEAR SYNCOPE: Primary | ICD-10-CM

## 2020-12-14 DIAGNOSIS — R55 VASOVAGAL SYNCOPE: ICD-10-CM

## 2020-12-14 PROCEDURE — 99204 OFFICE O/P NEW MOD 45 MIN: CPT | Performed by: INTERNAL MEDICINE

## 2020-12-14 PROCEDURE — 93000 ELECTROCARDIOGRAM COMPLETE: CPT | Performed by: NURSE PRACTITIONER

## 2021-01-05 ENCOUNTER — TELEPHONE (OUTPATIENT)
Dept: INTERNAL MEDICINE | Facility: CLINIC | Age: 26
End: 2021-01-05

## 2021-01-05 RX ORDER — MONTELUKAST SODIUM 10 MG/1
10 TABLET ORAL NIGHTLY
Qty: 90 TABLET | Refills: 1 | Status: SHIPPED | OUTPATIENT
Start: 2021-01-05 | End: 2021-09-24

## 2021-01-05 NOTE — TELEPHONE ENCOUNTER
Caller: LeoShireenPrachilindsey Ivy    Relationship: Self    Best call back number: [phone]     What medications are you currently taking:   Current Outpatient Medications on File Prior to Visit   Medication Sig Dispense Refill   • albuterol sulfate  (90 Base) MCG/ACT inhaler Inhale 2 puffs Every 4 (Four) Hours As Needed for Wheezing.     • montelukast (SINGULAIR) 10 MG tablet TAKE 1 TABLET BY MOUTH EVERY NIGHT 90 tablet 1   • STAHIST AD 25-60 MG tablet TK 1 T PO Q 12 HOURS PRN  4   • Symbicort 160-4.5 MCG/ACT inhaler Inhale 2 puffs 2 (Two) Times a Day. 10.2 g 12   • valACYclovir (VALTREX) 500 MG tablet Take 500 mg by mouth Daily As Needed.  3   • [DISCONTINUED] montelukast (SINGULAIR) 10 MG tablet Take 1 tablet by mouth Every Night. 90 tablet 1     No current facility-administered medications on file prior to visit.         When did you start taking these medications:     Which medication are you concerned about: SYMBICORT    Who prescribed you this medication: DR GOODE    What are your concerns: PATIENT WENT TO PHARMACY TO  MEDICATION AND WAS INFORMED THAT MEDICATION WAS NOT COVERED UNDER [employer] INSURANCE AND THE GENERIC WAS ALSO NOT COVERED. PATIENT IS OUT OF MEDICATION AS WELL.     How long have you been taking these medications: 7+ YEARS    How long have you had these concerns:

## 2021-01-06 NOTE — TELEPHONE ENCOUNTER
Tried to call Patient no answer lVM to CB. Patient will need to call her insurance and find out which medications is covered.

## 2021-01-06 NOTE — TELEPHONE ENCOUNTER
Patient returned called, advised patient of telephone encounter left per Nanette. Patient fully understood and will give insurance a call and then will call office back to let us know which medications are covered.

## 2021-01-07 NOTE — TELEPHONE ENCOUNTER
S/W pt, states she called her insurance and that even with a PA approval the Symbicort was still going to be $200.00+ per month.  States she has been using coupons up til now but that currently there are not any coupons for Symbicort that her pharmacy accepts.  States she asked her insurance what cheapest nearest med was and they said it was Advair Diskus and that would cost her $120.00 out of pocket and that she still can't even afford that.  States she is completely out of medication and needs something asap.

## 2021-01-07 NOTE — TELEPHONE ENCOUNTER
I have found some samples of Stiolto Respimat, which are similar to the Symbicort.  She will take 2 puffs once daily.  If the patient is agreeable, please leave the samples upfront for her.

## 2021-01-11 ENCOUNTER — TELEPHONE (OUTPATIENT)
Dept: INTERNAL MEDICINE | Facility: CLINIC | Age: 26
End: 2021-01-11

## 2021-01-11 NOTE — TELEPHONE ENCOUNTER
PT CALLED REQUESTING TO KNOW IF INSURANCE HAS REACHED OUT REGARDING:  Symbicort 160-4.5 MCG/ACT inhaler    PT STATED MEDICATION NEEDS A PRIOR AUTH    [id number] - [address] AT SEC OF [place] & [facility] - [phone]  - [phone] FX     PLEASE ADVISE AT: [phone]

## 2021-01-11 NOTE — TELEPHONE ENCOUNTER
This form was completed and placed on MAs desk to fax.  We do not have any samples of Symbicort but she could reach out to her allergy specialist to see if they have any in interim.

## 2021-01-12 ENCOUNTER — PATIENT MESSAGE (OUTPATIENT)
Dept: INTERNAL MEDICINE | Facility: CLINIC | Age: 26
End: 2021-01-12

## 2021-01-12 NOTE — TELEPHONE ENCOUNTER
From: Prachi Bailey  To: Pat Fields MD  Sent: 1/12/2021 10:46 AM EST  Subject: Prescription Question    Hi Dr. Fields, I wanted to ask if you could send in a prescription for Advair? I'm still waiting to hear from insurance regarding Symbicort and if they will cover it. I know they will cover advair and as of right now that is the cheapest option I have to pick from. I also really appreciate the samples you had given me but I am still having a tough time breathing and it does not seem to help much.

## 2021-01-12 NOTE — TELEPHONE ENCOUNTER
Called and spoke to patient, informed her that we do not have Symbicort samples in office and to check with allergist. Also informed her that paperwork has been completed just pending fax. Will leave for MA to complete fax.

## 2021-01-13 ENCOUNTER — PRIOR AUTHORIZATION (OUTPATIENT)
Dept: INTERNAL MEDICINE | Facility: CLINIC | Age: 26
End: 2021-01-13

## 2021-01-13 NOTE — TELEPHONE ENCOUNTER
Prior authorization Denial recieved for Symbicort Aerosol 160-4.5 due to lack of information. PA will be resubmitted via [email or web address]

## 2021-01-13 NOTE — TELEPHONE ENCOUNTER
Prachi Bailey (Key: [id number])  Drug:Symbicort 160-4.5MCG/ACT aerosol    Your information has been sent to [facility].

## 2021-01-13 NOTE — TELEPHONE ENCOUNTER
Please see PA encounter, a new PA has been submitted due to error with first PA submission. Denial letter placed on PCP desk, new PA has been submitted via covermymeds. Please see PA encounter, 01/13/2021. Closing call.

## 2021-04-21 ENCOUNTER — E-VISIT (OUTPATIENT)
Dept: FAMILY MEDICINE CLINIC | Facility: TELEHEALTH | Age: 26
End: 2021-04-21

## 2021-04-21 DIAGNOSIS — J45.50 SEVERE PERSISTENT ASTHMA WITHOUT COMPLICATION: ICD-10-CM

## 2021-04-21 DIAGNOSIS — J32.9 SINUSITIS, UNSPECIFIED CHRONICITY, UNSPECIFIED LOCATION: Primary | ICD-10-CM

## 2021-04-21 PROCEDURE — 99422 OL DIG E/M SVC 11-20 MIN: CPT | Performed by: NURSE PRACTITIONER

## 2021-04-21 RX ORDER — AMOXICILLIN AND CLAVULANATE POTASSIUM 875; 125 MG/1; MG/1
1 TABLET, FILM COATED ORAL 2 TIMES DAILY
Qty: 14 TABLET | Refills: 0 | Status: SHIPPED | OUTPATIENT
Start: 2021-04-21 | End: 2021-04-28

## 2021-04-21 RX ORDER — METHYLPREDNISOLONE 4 MG/1
TABLET ORAL
Qty: 1 EACH | Refills: 0 | Status: SHIPPED | OUTPATIENT
Start: 2021-04-21 | End: 2021-09-24

## 2021-04-21 NOTE — PROGRESS NOTES
Prachi Bailey    1995  [id number]    E-Visit questionnaire, patient's answers and chart have been reviewed. The patient's complaints, my assessment and plan are as follows:    Questionnaire:   --Reason for E-Visit: Sinus Problems  --exposure to flu or COVID-19 in past 14 days:  No  --sudden loss of taste or smell:  No    HPI    Prachi Bailey is a 25 y.o. female with complaints of Sinus Problem  Patient complains of headache, cough, sneezing, yellow nasal discharge for 1-2 weeks. Symptoms have been gradually worsening since that time.  Past history is significant for moderate persistent Asthma. Patient is non-smoker. Patient has tried the following medications: currently on singulair, albuterol and stahist AD    Review of Systems - History obtained from chart review and the patient via questionnaire    General ROS: negative for - chills, fatigue, or fever  ENT ROS: positive for - headaches, nasal congestion, nasal discharge, and sinus pain  Allergy and Immunology ROS: positive for - nasal congestion, postnasal drip, and asthma  Respiratory ROS: positive for - cough  negative for - shortness of breath or wheezing    Chart review including patient's allergies, medications, PSH and PMH  was completed.    Diagnoses and all orders for this visit:    1. Sinusitis, unspecified chronicity, unspecified location (Primary)    2. Severe persistent asthma without complication        Take antibiotic until gone. May take probiotic with antibiotic if prone to antibiotic induced diarrhea. Flonase is recommended for daily use by most ENTs if you have Allergic or Reactive sinus problems. It will help with nasal congestion, but takes several days to become fully effective and is not a fast acting medication. It is also recommended to start and continue Claritin, Zyrtec or Allegra daily to control postnasal drainage, if you are prone to reactive sinus issues due to weather or seasonal changes. Cool mist humidifier at bedside  will help secretions remain thin and more easily drain, relieving the pressure in your sinuses. Follow up with PCP, Urgent Care or Video Visit if symptoms have not resolved in 7-10 days. If symptoms worse, go to Urgent Care or follow up with PCP. If you develop high fever, chest pain, shortness of breath or any life-threatening symptoms, go to nearest Emergency Department.          Any medications prescribed have been sent electronically to   [facility] - [place], [place] - [address] [address] - [phone]  - [phone] [address] [address]  Phone: [phone] Fax: [phone]      Time Documentation  Counseled patient  Counseling topics: diagnosis, treatment options, follow up plan and return instructions  Total encounter time: counseling time more than 50% of visit: 15 minutes        Ana Leo, APRN  04/21/21  11:10 EDT

## 2021-04-21 NOTE — PATIENT INSTRUCTIONS
Take antibiotic until gone. May take probiotic with antibiotic if prone to antibiotic induced diarrhea. Flonase is recommended for daily use by most ENTs if you have Allergic or Reactive sinus problems. It will help with nasal congestion, but takes several days to become fully effective and is not a fast acting medication. It is also recommended to start and continue Claritin, Zyrtec or Allegra daily to control postnasal drainage, if you are prone to reactive sinus issues due to weather or seasonal changes. Cool mist humidifier at bedside will help secretions remain thin and more easily drain, relieving the pressure in your sinuses. Follow up with PCP, Urgent Care or Video Visit if symptoms have not resolved in 7-10 days. If symptoms worse, go to Urgent Care or follow up with PCP. If you develop high fever, chest pain, shortness of breath or any life-threatening symptoms, go to nearest Emergency Department.    Sinusitis, Adult  Sinusitis is soreness and swelling (inflammation) of your sinuses. Sinuses are hollow spaces in the bones around your face. They are located:  · Around your eyes.  · In the middle of your forehead.  · Behind your nose.  · In your cheekbones.  Your sinuses and nasal passages are lined with a fluid called mucus. Mucus drains out of your sinuses. Swelling can trap mucus in your sinuses. This lets germs (bacteria, virus, or fungus) grow, which leads to infection. Most of the time, this condition is caused by a virus.  What are the causes?  This condition is caused by:  · Allergies.  · Asthma.  · Germs.  · Things that block your nose or sinuses.  · Growths in the nose (nasal polyps).  · Chemicals or irritants in the air.  · Fungus (rare).  What increases the risk?  You are more likely to develop this condition if:  · You have a weak body defense system (immune system).  · You do a lot of swimming or diving.  · You use nasal sprays too much.  · You smoke.  What are the signs or symptoms?  The main  symptoms of this condition are pain and a feeling of pressure around the sinuses. Other symptoms include:  · Stuffy nose (congestion).  · Runny nose (drainage).  · Swelling and warmth in the sinuses.  · Headache.  · Toothache.  · A cough that may get worse at night.  · Mucus that collects in the throat or the back of the nose (postnasal drip).  · Being unable to smell and taste.  · Being very tired (fatigue).  · A fever.  · Sore throat.  · Bad breath.  How is this diagnosed?  This condition is diagnosed based on:  · Your symptoms.  · Your medical history.  · A physical exam.  · Tests to find out if your condition is short-term (acute) or long-term (chronic). Your doctor may:  ? Check your nose for growths (polyps).  ? Check your sinuses using a tool that has a light (endoscope).  ? Check for allergies or germs.  ? Do imaging tests, such as an MRI or CT scan.  How is this treated?  Treatment for this condition depends on the cause and whether it is short-term or long-term.  · If caused by a virus, your symptoms should go away on their own within 10 days. You may be given medicines to relieve symptoms. They include:  ? Medicines that shrink swollen tissue in the nose.  ? Medicines that treat allergies (antihistamines).  ? A spray that treats swelling of the nostrils.   ? Rinses that help get rid of thick mucus in your nose (nasal saline washes).  · If caused by bacteria, your doctor may wait to see if you will get better without treatment. You may be given antibiotic medicine if you have:  ? A very bad infection.  ? A weak body defense system.  · If caused by growths in the nose, you may need to have surgery.  Follow these instructions at home:  Medicines  · Take, use, or apply over-the-counter and prescription medicines only as told by your doctor. These may include nasal sprays.  · If you were prescribed an antibiotic medicine, take it as told by your doctor. Do not stop taking the antibiotic even if you start to  feel better.  Hydrate and humidify    · Drink enough water to keep your pee (urine) pale yellow.  · Use a cool mist humidifier to keep the humidity level in your home above 50%.  · Breathe in steam for 10-15 minutes, 3-4 times a day, or as told by your doctor. You can do this in the bathroom while a hot shower is running.  · Try not to spend time in cool or dry air.  Rest  · Rest as much as you can.  · Sleep with your head raised (elevated).  · Make sure you get enough sleep each night.  General instructions    · Put a warm, moist washcloth on your face 3-4 times a day, or as often as told by your doctor. This will help with discomfort.  · Wash your hands often with soap and water. If there is no soap and water, use hand .  · Do not smoke. Avoid being around people who are smoking (secondhand smoke).  · Keep all follow-up visits as told by your doctor. This is important.  Contact a doctor if:  · You have a fever.  · Your symptoms get worse.  · Your symptoms do not get better within 10 days.  Get help right away if:  · You have a very bad headache.  · You cannot stop throwing up (vomiting).  · You have very bad pain or swelling around your face or eyes.  · You have trouble seeing.  · You feel confused.  · Your neck is stiff.  · You have trouble breathing.  Summary  · Sinusitis is swelling of your sinuses. Sinuses are hollow spaces in the bones around your face.  · This condition is caused by tissues in your nose that become inflamed or swollen. This traps germs. These can lead to infection.  · If you were prescribed an antibiotic medicine, take it as told by your doctor. Do not stop taking it even if you start to feel better.  · Keep all follow-up visits as told by your doctor. This is important.  This information is not intended to replace advice given to you by your health care provider. Make sure you discuss any questions you have with your health care provider.  Document Revised: 05/20/2019 Document  Reviewed: 05/20/2019  Elsevier Patient Education © 2021 [employer]

## 2021-05-18 NOTE — TELEPHONE ENCOUNTER
Sent in Zenops again to the pharmacy.  Please start PA ASAP as patient has a history of severe asthma.    Please notify patient when PA has been approved (or if denied let me know what I need to send in instead).   no

## 2021-09-16 ENCOUNTER — TELEPHONE (OUTPATIENT)
Dept: INTERNAL MEDICINE | Facility: CLINIC | Age: 26
End: 2021-09-16

## 2021-09-16 NOTE — TELEPHONE ENCOUNTER
Caller: Prachi Bailey    Relationship: Self    Best call back number: [phone]    What medication are you requesting: SYMBICORT    What are your current symptoms: ASTHMA     How long have you been experiencing symptoms:  ALL HER LIFE    Have you had these symptoms before:    [x] Yes  [] No    Have you been treated for these symptoms before:   [x] Yes  [] No    If a prescription is needed, what is your preferred pharmacy and phone number:  [facility] - [address] [address] - [phone]  - [phone]     Additional notes:  PRACHI STATES THAT HER INSURANCE DEDUCTIBLE IS MET AND SHE IS REQUESTING SYMBICORT.  SHE IS OUT OF HER MEDICATION.  SYMBICORT WORK VERY WELL LAST TIME SHE USED IT. THE ADVAIR DOESN'T WORK WELL FOR HER.

## 2021-09-17 RX ORDER — BUDESONIDE AND FORMOTEROL FUMARATE DIHYDRATE 160; 4.5 UG/1; UG/1
2 AEROSOL RESPIRATORY (INHALATION)
Qty: 10.2 G | Refills: 3 | Status: SHIPPED | OUTPATIENT
Start: 2021-09-17 | End: 2021-12-30

## 2021-09-17 NOTE — TELEPHONE ENCOUNTER
Patient called, in regards to SYMBICORT. Advised patient, script was called in. Patient fully understood

## 2021-09-24 ENCOUNTER — OFFICE VISIT (OUTPATIENT)
Dept: INTERNAL MEDICINE | Facility: CLINIC | Age: 26
End: 2021-09-24

## 2021-09-24 ENCOUNTER — LAB (OUTPATIENT)
Dept: LAB | Facility: HOSPITAL | Age: 26
End: 2021-09-24

## 2021-09-24 VITALS
OXYGEN SATURATION: 98 % | HEART RATE: 69 BPM | BODY MASS INDEX: 31.72 KG/M2 | WEIGHT: 190.38 LBS | SYSTOLIC BLOOD PRESSURE: 98 MMHG | RESPIRATION RATE: 20 BRPM | HEIGHT: 65 IN | TEMPERATURE: 97.1 F | DIASTOLIC BLOOD PRESSURE: 60 MMHG

## 2021-09-24 DIAGNOSIS — J45.20 MILD INTERMITTENT ASTHMA, UNSPECIFIED WHETHER COMPLICATED: ICD-10-CM

## 2021-09-24 DIAGNOSIS — Z13.220 LIPID SCREENING: ICD-10-CM

## 2021-09-24 DIAGNOSIS — Z00.00 WELL ADULT EXAM: ICD-10-CM

## 2021-09-24 DIAGNOSIS — Z00.00 WELL ADULT EXAM: Primary | ICD-10-CM

## 2021-09-24 DIAGNOSIS — M72.2 PLANTAR FASCIITIS, BILATERAL: ICD-10-CM

## 2021-09-24 DIAGNOSIS — J30.2 SEASONAL ALLERGIES: ICD-10-CM

## 2021-09-24 LAB
ALBUMIN SERPL-MCNC: 4.7 G/DL (ref 3.5–5.2)
ALBUMIN/GLOB SERPL: 1.7 G/DL
ALP SERPL-CCNC: 86 U/L (ref 39–117)
ALT SERPL W P-5'-P-CCNC: 24 U/L (ref 1–33)
ANION GAP SERPL CALCULATED.3IONS-SCNC: 8.8 MMOL/L (ref 5–15)
AST SERPL-CCNC: 20 U/L (ref 1–32)
BILIRUB SERPL-MCNC: 0.4 MG/DL (ref 0–1.2)
BUN SERPL-MCNC: 12 MG/DL (ref 6–20)
BUN/CREAT SERPL: 14.8 (ref 7–25)
CALCIUM SPEC-SCNC: 9.1 MG/DL (ref 8.6–10.5)
CHLORIDE SERPL-SCNC: 104 MMOL/L (ref 98–107)
CHOLEST SERPL-MCNC: 169 MG/DL (ref 0–200)
CO2 SERPL-SCNC: 24.2 MMOL/L (ref 22–29)
CREAT SERPL-MCNC: 0.81 MG/DL (ref 0.57–1)
DEPRECATED RDW RBC AUTO: 38 FL (ref 37–54)
ERYTHROCYTE [DISTWIDTH] IN BLOOD BY AUTOMATED COUNT: 11.7 % (ref 12.3–15.4)
GFR SERPL CREATININE-BSD FRML MDRD: 84 ML/MIN/1.73
GLOBULIN UR ELPH-MCNC: 2.8 GM/DL
GLUCOSE SERPL-MCNC: 90 MG/DL (ref 65–99)
HCT VFR BLD AUTO: 39.1 % (ref 34–46.6)
HDLC SERPL-MCNC: 62 MG/DL (ref 40–60)
HGB BLD-MCNC: 13.2 G/DL (ref 12–15.9)
LDLC SERPL CALC-MCNC: 97 MG/DL (ref 0–100)
LDLC/HDLC SERPL: 1.57 {RATIO}
MCH RBC QN AUTO: 30.3 PG (ref 26.6–33)
MCHC RBC AUTO-ENTMCNC: 33.8 G/DL (ref 31.5–35.7)
MCV RBC AUTO: 89.7 FL (ref 79–97)
PLATELET # BLD AUTO: 260 10*3/MM3 (ref 140–450)
PMV BLD AUTO: 11.1 FL (ref 6–12)
POTASSIUM SERPL-SCNC: 4.4 MMOL/L (ref 3.5–5.2)
PROT SERPL-MCNC: 7.5 G/DL (ref 6–8.5)
RBC # BLD AUTO: 4.36 10*6/MM3 (ref 3.77–5.28)
SODIUM SERPL-SCNC: 137 MMOL/L (ref 136–145)
T4 FREE SERPL-MCNC: 1.08 NG/DL (ref 0.93–1.7)
TRIGL SERPL-MCNC: 47 MG/DL (ref 0–150)
TSH SERPL DL<=0.05 MIU/L-ACNC: 0.79 UIU/ML (ref 0.27–4.2)
VLDLC SERPL-MCNC: 10 MG/DL (ref 5–40)
WBC # BLD AUTO: 5.19 10*3/MM3 (ref 3.4–10.8)

## 2021-09-24 PROCEDURE — 84439 ASSAY OF FREE THYROXINE: CPT

## 2021-09-24 PROCEDURE — 80053 COMPREHEN METABOLIC PANEL: CPT

## 2021-09-24 PROCEDURE — 80061 LIPID PANEL: CPT

## 2021-09-24 PROCEDURE — 84443 ASSAY THYROID STIM HORMONE: CPT

## 2021-09-24 PROCEDURE — 99395 PREV VISIT EST AGE 18-39: CPT | Performed by: INTERNAL MEDICINE

## 2021-09-24 PROCEDURE — 85027 COMPLETE CBC AUTOMATED: CPT

## 2021-09-24 RX ORDER — CHLORCYCLIZINE HYDROCHLORIDE AND PSEUDOEPHEDRINE HYDROCHLORIDE 25; 60 MG/1; MG/1
1 TABLET ORAL DAILY
Qty: 90 TABLET | Refills: 1 | Status: SHIPPED | OUTPATIENT
Start: 2021-09-24 | End: 2022-04-22

## 2021-09-24 NOTE — PROGRESS NOTES
"Chief Complaint  Establish Care and Annual Exam    Subjective    Prachi Bailey is a 26 y.o. female.     Prachi Bailey presents to [facility] for       History of Present Illness    The following portions of the patient's history were reviewed and updated as appropriate: allergies, current medications, past family history, past medical history, past social history, past surgical history and problem list.         Complete Adult Physical    1 Asthma-chronic stable.  Patient reports no prolonged episodes of any coughing wheezing.  She did have an episode within the past 1 to 2 days of mild coughing and wheezing increase albuterol usage but this has improved tremendously.    2  Both feet plantar fasciatis-patient says that when she gets up out of bed she experiences bilateral plantar pain upon standing and        Diet: Regular        Exercise: Minimal to none        Social History: No EtOH consumption, no IV drug use, no marijuana, no illicit drugs.        Preventative Screenings  Pap smear June 2021 normal         Immunizations: Up-to-date            Review of Systems   All other systems reviewed and are negative.      Objective   Vital Signs:   BP 98/60   Pulse 69   Temp 97.1 °F (36.2 °C) (Temporal)   Resp 20   Ht 165.1 cm (65\")   Wt 86.4 kg (190 lb 6 oz)   SpO2 98%   BMI 31.68 kg/m²     Body mass index is 31.68 kg/m².    Physical Exam  Vitals and nursing note reviewed.   Constitutional:       Appearance: Normal appearance. She is normal weight.   HENT:      Head: Normocephalic and atraumatic.      Right Ear: Tympanic membrane, ear canal and external ear normal.      Left Ear: Tympanic membrane, ear canal and external ear normal.      Nose: Nose normal.      Mouth/Throat:      Mouth: Mucous membranes are moist.   Eyes:      Extraocular Movements: Extraocular movements intact.      Pupils: Pupils are equal, round, and reactive to light.   Cardiovascular:    "   Rate and Rhythm: Normal rate.      Pulses: Normal pulses.      Heart sounds: Normal heart sounds.   Pulmonary:      Effort: Pulmonary effort is normal.   Abdominal:      General: Bowel sounds are normal.      Palpations: Abdomen is soft.   Musculoskeletal:         General: Tenderness present. Normal range of motion.      Cervical back: Normal range of motion and neck supple.      Comments: Mild tenderness to the palpation of the plantar surface region and left foot   Skin:     General: Skin is warm.      Capillary Refill: Capillary refill takes less than 2 seconds.   Neurological:      General: No focal deficit present.      Mental Status: She is alert.   Psychiatric:         Mood and Affect: Mood normal.         Behavior: Behavior normal.         Thought Content: Thought content normal.         Judgment: Judgment normal.               Assessment and Plan  Diagnoses and all orders for this visit:      .Diagnoses and all orders for this visit:    1. Well adult exam (Primary)    2. Plantar fasciitis, bilateral-recommend NSAIDs, passive range of motion exercises, insoles for foot support    3. Mild intermittent asthma, unspecified whether complicated-continue on inhaler therapy.    4. Lipid screening    5. Seasonal allergies  -     Stahist AD 25-60 MG tablet; Take 1 tablet by mouth Daily.  Dispense: 90 tablet; Refill: 1  Anticipatory guidance: I recommend routine ophthalmology visit.

## 2021-12-30 RX ORDER — BUDESONIDE AND FORMOTEROL FUMARATE DIHYDRATE 160; 4.5 UG/1; UG/1
AEROSOL RESPIRATORY (INHALATION)
Qty: 10.2 G | Refills: 2 | Status: SHIPPED | OUTPATIENT
Start: 2021-12-30 | End: 2022-03-24

## 2022-03-24 ENCOUNTER — OFFICE VISIT (OUTPATIENT)
Dept: INTERNAL MEDICINE | Facility: CLINIC | Age: 27
End: 2022-03-24

## 2022-03-24 VITALS
OXYGEN SATURATION: 99 % | HEART RATE: 79 BPM | SYSTOLIC BLOOD PRESSURE: 124 MMHG | RESPIRATION RATE: 20 BRPM | BODY MASS INDEX: 32.78 KG/M2 | TEMPERATURE: 98.4 F | DIASTOLIC BLOOD PRESSURE: 64 MMHG | WEIGHT: 197 LBS

## 2022-03-24 DIAGNOSIS — J45.50 SEVERE PERSISTENT ASTHMA, UNSPECIFIED WHETHER COMPLICATED: Primary | ICD-10-CM

## 2022-03-24 PROCEDURE — 99214 OFFICE O/P EST MOD 30 MIN: CPT | Performed by: INTERNAL MEDICINE

## 2022-03-24 RX ORDER — BUDESONIDE 90 UG/1
1 AEROSOL, POWDER RESPIRATORY (INHALATION)
Qty: 1 EACH | Refills: 3 | Status: SHIPPED | OUTPATIENT
Start: 2022-03-24 | End: 2022-04-22

## 2022-03-24 NOTE — PROGRESS NOTES
Chief Complaint  Med Refill    Subjective    Prachi Bailey is a 26 y.o. female.     Prachi Bailey presents to [facility] for       History of Present Illness    The following portions of the patient's history were reviewed and updated as appropriate: allergies, current medications, past family history, past medical history, past social history, past surgical history and problem list.    Pregnancy currently at 24 weeks    1 asthma- Patient has been having difficult with breathing.  Patient says that she has had some episodes of coughing and wheezing where she is dependent on taking her long-acting albuterol steroid inhaler.  Patient has been doing very well and controlled on the Symbicort but recently her insurance has increased the price on co-pay to $300 and she says that she cannot pay this but she wants to make sure that her asthma is controlled at respective her current pregnancy.  Triggers: Possible changes in seasons  Last ER visit or hospitilzation   Patient says that she is dependent on her long-acting inhaler as this helps keep her coughing and wheezing downa    Review of Systems   All other systems reviewed and are negative.      Objective   Vital Signs:   /64 (BP Location: Right arm, Patient Position: Sitting, Cuff Size: Adult)   Pulse 79   Temp 98.4 °F (36.9 °C) (Temporal)   Resp 20   Wt 89.4 kg (197 lb)   SpO2 99%   BMI 32.78 kg/m²     Body mass index is 32.78 kg/m².    Physical Exam  Vitals and nursing note reviewed.   Constitutional:       Appearance: Normal appearance. She is normal weight.   HENT:      Head: Normocephalic and atraumatic.      Right Ear: Tympanic membrane, ear canal and external ear normal.      Left Ear: Tympanic membrane, ear canal and external ear normal.      Nose: Nose normal.      Mouth/Throat:      Mouth: Mucous membranes are moist.   Eyes:      Extraocular Movements: Extraocular movements intact.       Pupils: Pupils are equal, round, and reactive to light.   Cardiovascular:      Rate and Rhythm: Normal rate and regular rhythm.      Pulses: Normal pulses.      Heart sounds: Normal heart sounds.   Pulmonary:      Effort: Pulmonary effort is normal.      Breath sounds: Normal breath sounds.   Abdominal:      General: Bowel sounds are normal.      Palpations: Abdomen is soft.   Musculoskeletal:         General: Normal range of motion.      Cervical back: Normal range of motion and neck supple.   Skin:     General: Skin is warm.      Capillary Refill: Capillary refill takes less than 2 seconds.   Neurological:      Mental Status: She is alert.   Psychiatric:         Mood and Affect: Mood normal.         Behavior: Behavior normal.         Thought Content: Thought content normal.         Judgment: Judgment normal.               Assessment and Plan  Diagnoses and all orders for this visit:      Diagnoses and all orders for this visit:    1. Severe persistent asthma, unspecified whether complicated (Primary)  -     budesonide (Pulmicort Flexhaler) 90 MCG/ACT inhaler; Inhale 1 puff 2 (Two) Times a Day.  Dispense: 1 each; Refill: 3    Also instructed to take albuterol rescue inhaler more frequently while taking this to give the equivalent effect that the Symbicort would with both this combination.  Patient should continue to follow-up with OB in regards to her pregnancy scheduled    I will see her back in 4 weeks either in person or video to make sure her asthma is doing well.

## 2022-03-30 ENCOUNTER — E-VISIT (OUTPATIENT)
Dept: FAMILY MEDICINE CLINIC | Facility: TELEHEALTH | Age: 27
End: 2022-03-30

## 2022-03-30 PROCEDURE — BRIGHTMDVISIT: Performed by: NURSE PRACTITIONER

## 2022-03-30 NOTE — EXTERNAL PATIENT INSTRUCTIONS
Note   You have symptoms of a viral illness with symptoms less than 48 hours with symptoms treatment recommended. I have included some medications for the cough and congestion that are recommended during pregnancy. Other over the counter medications should be approved by your obstetrician (ob provider). Symptoms can last 7-10 days, but if they last longer or worsen follow up at urgent care.   Diagnosis   Common cold   My name is Cathy Shepherd, and I'm a healthcare provider at [facility]. I carefully reviewed the symptoms you reported in your interview, and I see that you may have a cold.   With the ongoing COVID-19 pandemic, it can be hard to tell the difference between the common cold and a COVID-19 infection. Many cold symptoms are the same as COVID-19 symptoms. Most people with either illness have mild to moderate symptoms and can rest at home until they get better.   To prevent the spread of illness to others, I recommend that you stay home and away from other people as much as possible while you're sick.   Here are the main things to know about your current symptoms:    Colds get better on their own.    You can use the medications I recommend here to help ease your symptoms.   Based on what you told me in your interview, I haven't prescribed any antibiotics. Antibiotics fight bacteria, not viruses. They don't help when you have a viral infection like the common cold. Antibiotics could even make you feel worse as they can cause or worsen nausea, diarrhea, and stomach pain. The following factors suggest that a virus is causing your symptoms:    You've had symptoms for less than 10 days. A bacterial infection is suspected when you've had symptoms longer than 10 days without improvement.    You don't have sinus pain.   Medications   Your pharmacy   [id number] [address] [id number] [phone]     Prescription      Benzonatate (100mg): Take 1 capsule by mouth 3  times a day as needed for cough. Do not chew or cut these capsules.      Mucus Relief ER oral tablet, extended release (600mg): Take 1 tablet by mouth every 12 hours as needed for cough and congestion.   Medications won't cure the cold. However, they may help you feel better while your immune system fights the virus.   Pregnancy and breastfeeding   The medications in this treatment plan are safe to take while pregnant.   About your diagnosis   The common cold is a viral infection of the respiratory tract, which includes the nose, throat, breathing passages, and lungs. These are the key things to know about colds:    There is no vaccine to prevent colds and no cure for a cold.    Some medications can lessen your symptoms.    You can spread a cold to other people.    Over 200 different viruses can cause the common cold. That's why you can get another cold after you've just had one.   Common symptoms include low-grade fever, sneezing, runny nose, congestion, sore throat, and cough. You may also notice fatigue, body aches, difficulty sleeping, or decreased appetite.   What to expect   You should feel better within 5 to 7 days and be back to normal within 14 days.   You can return to your normal activities when ALL of the following are true:    You've been fever-free for more than 24 hours without using fever-reducing medications such as Tylenol    Your other symptoms have improved    It's been at least 5 full days since your symptoms first started   When to seek care   Call us at [phone]   with any sudden or unexpected symptoms.    Fever that measures over 103F or continues for more than 3 days.    A worsening headache.    Swallowing becomes extremely difficult or impossible.    Hoarse voice or loss of voice lasting longer than 2 weeks.    Sinus pain that lasts for more than 10 days, without improvement.    More than 5 episodes of diarrhea in a day.    More than 5 episodes of vomiting in a day.    Coughing up  red or bloody mucus.    Severe shortness of breath.    Severe stomach pain.    Symptoms that get better for a few days, and then suddenly get worse.    Severe chest pain    You mentioned that your cold symptoms are making your asthma worse. If your inhaler doesn't help or if your symptoms continue to get worse, contact us for an appointment.   Other treatment    Rest! Your body needs rest to recover and fight the virus.    Drink plenty of water to stay hydrated.    Use a clean humidifier or a cool-mist vaporizer in your room at night. Breathing humid air may help with nasal congestion and cough.    Try non-prescription saline nasal sprays to help your nasal symptoms.    Try using a Neti Pot to flush out your stuffy nose and sinuses. Neti Pots are available at any drugstore without a prescription.    Gargle with salt water several times a day to help your throat feel better. Cough drops and throat lozenges may provide extra relief. A teaspoon of honey stirred into warm water or weak tea can help soothe a sore throat and cough.    Avoid smoke and air pollution. Smoke can make infections worse.   Prevention    Avoid close contact with other people when you're sick.    Cover your mouth and nose when you cough or sneeze. Use a tissue or cough into your elbow. Make sure that used tissues go directly into the trash.    Avoid touching your eyes, nose, or mouth while you're sick.    Wash your hands often, especially after coughing, sneezing, or blowing your nose. If soap and water are not available, use an alcohol-based hand .    If you or someone in your home or workplace is sick, disinfect commonly used items. This includes door handles, tables, computers, remotes, and pens.    Coronavirus (COVID-19) information   Common symptoms of COVID-19 include fever, cough, shortness of breath, fatigue, muscle or body aches, headaches, new loss of sense of taste or smell, sore throat, stuffy or runny nose, nausea or  vomiting, and diarrhea. Most people who get COVID-19 have mild symptoms and can rest at home until they get better. Elderly people and those with chronic medical problems may be at risk for more serious complications.   FAQs about the COVID-19 vaccine   There are three authorized COVID-19 vaccines: Riccardo & Riccardo's Uriel Vaccine (J&J/Uriel), Moderna, and Pfizer-Yogiyo (Pfizer). The J&J/Uriel and Moderna vaccines are approved for use in people aged 18 and older. The Pfizer vaccine is approved for those aged 5 and older. All three are available at no cost. Even if you don't have health insurance, you can still get the COVID-19 vaccine for free.   Which vaccine is the best? Which vaccine should I get?   All three vaccines are highly effective. Even if you get COVID-19 after being vaccinated, all of the vaccines help prevent severe disease, hospitalization, and complications.   Most people should get whichever vaccine is first available to them. However, women younger than 50 years old should consider the rare risk of blood clots with low platelets after vaccination with the J&J/Uriel vaccine. This risk hasn't been seen with the other two vaccines.   Are the vaccines safe?   Yes. Hundreds of millions of people in the [place] have already safely received COVID-19 vaccines. As part of Phase 3 clinical trials in the [place] and other countries, researchers collected safety and efficacy data for all three vaccines. These clinical trials follow strict standards. Before a vaccine is approved, the manufacturing company must submit data to the Food and Drug Administration (FDA) for review. Tens of thousands of volunteers participated in the clinical trials for the vaccines. The FDA continues to monitor safety data as the vaccines are given to the general population.   Do I need the vaccine if I've already had COVID?   Yes. Vaccination helps protect you even if you've already had COVID.   If you had COVID-19 and had symptoms,  wait to get vaccinated until ALL of the following are true:    5 full days since your symptoms started    24 hours with no fever, without the use of fever-reducing medications    Your other COVID-19 symptoms are improving   If you tested positive for COVID-19 but did not have symptoms, you can get vaccinated after 5 full days have passed since you had a positive test, as long as you don't develop symptoms.   If you had COVID-19 and were treated with monoclonal antibodies, you should wait 90 days before getting a COVID-19 vaccination.   How many doses of the vaccine do I need?   It depends on your age, which vaccine you get, and any underlying medical conditions you may have.   To be fully vaccinated, you need to complete the primary series of COVID-19 vaccines. This means:   One dose of the J&J/Uriel vaccine   or   Two doses of the Moderna vaccine, spaced 4 weeks apart   or   Two doses of the Pfizer vaccine, spaced 3 weeks apart   If you're immunocompromised, the primary series may include a third dose of the Moderna or Pfizer vaccine, given 28 days after the second dose.   To be up to date on vaccines, you need to get all of the recommended COVID-19 vaccines, including boosters. People who are up to date have the best protection against a COVID-19 infection.   I'm immunocompromised. Do I qualify for a third dose?   If any of these situations apply, you have a moderately to severely weakened immune system:    You're getting active treatment for a cancer or tumor of the blood    You've had an organ transplant and are taking medicine to suppress your immune system    You've had a stem cell transplant within the last 2 years    You're taking medicine to suppress your immune system, such as high-dose corticosteroids    You have moderate to severe primary immunodeficiency (such as DiGeorge syndrome, Wiskott-[place] syndrome)    You have advanced or untreated HIV infection   If you got the Pfizer vaccine and are 5 years  old or older, AND it's been at least 28 days since your second shot, you should get an additional primary shot of the Pfizer vaccine.   If you got the Moderna vaccine and are 18 years old or older, AND it's been at least 28 days since your second shot, you should get an additional primary shot of the Moderna vaccine.   If you got the J&J/Uriel vaccine, no additional primary shot is recommended at this time.   If you think you need an additional primary shot, speak with your care team.   I'm fully vaccinated. Do I need a booster shot?   Everyone 12 and older should get a booster shot. Immunity from vaccinations can decrease over time, and a booster renews the effect of the vaccination. If you're 12 to 17 years old, you can get the Pfizer booster. If you're 18 or older, you can get the Pfizer or Moderna booster.   If you got the J&J/Uriel vaccine AND it's been at least 2 months since your shot, you should get a booster shot now.   If you got the Pfizer vaccine AND it's been at least 5 months since your second dose, you should get a booster shot now.   If you got the Moderna vaccine AND it's been at least 5 months since your second dose, you should get a booster shot now.   If you'd like more information on where and how to get a booster shot, speak with your care team.   What are the common side effects of the vaccine?   A sore arm, tiredness, headache, and muscle pain may occur within two days of getting the vaccine and last a day or two. For the Moderna or Pfizer vaccines, side effects are more common after the second dose. People over the age of 55 are less likely to have side effects than younger people.   After I'm up to date on vaccines, can I still get or spread COVID?   Yes, but your disease should be milder. And your risk of serious illness, hospitalization, and complications will be much lower, especially if you're up to date. Being vaccinated reduces the risk of spreading the disease if you get it.    After I'm up to date on vaccines, can I go back to normal?   You should still wear a mask indoors in public if:    It's required by laws, rules, regulations, or local guidance.    You have a weakened immune system.    Your age puts you at increased risk of severe disease.    You have a medical condition that puts you at increased risk of severe disease.    Someone in your household has a weakened immune system, is at increased risk for severe disease, or is unvaccinated.    You're in an area of high transmission.   For travel information, see the CDC's latest guidance at https://www.cdc.gov  .   Everyone who tests positive for COVID, regardless of vaccination or booster status, should:    Stay home for at least 5 days. Day 1 is the first full day after your symptoms started. If you never develop symptoms, day 1 is the first full day after the sample was taken for the test.    If you have symptoms, continue to stay home until you're fever free for 24 hours without the use of fever-reducing medicines and your other symptoms have improved.    If you never develop symptoms, you may leave your house after day 5.    You should wear a well-fitting mask around others at home and in public until day 10, even if you don't have symptoms or your symptoms are improving.    Don't travel until after at least day 10.    Don't go to places where you can't wear a mask, such as restaurants, bars, or gyms.   If you're exposed to COVID, follow the advice below based on your vaccination status.   If any of these apply:    You're 18 or older and have had all recommended vaccine doses, including boosters and additional primary shots for certain immunocompromised people    You're 5 to 17 years old and have had 2 doses of the Pfizer vaccine    You've had COVID-19 within the last 90 days, confirmed by a nose or throat swab test   Then:    Wear a well-fitting mask around others for 10 days. The date of last close contact is considered day 0.     Get tested at least 5 days after you last had close contact with someone with COVID-19.   If any of these apply:    You're 18 or older, have had two doses of the Pfizer or Moderna vaccine, but have NOT gotten a recommended booster shot    You got a dose of the J&J/Uriel vaccine more than 2 months ago, but you have NOT gotten a recommended booster shot    You're not vaccinated   Then:    Stay home for at least 5 days after your last close contact with a person who has COVID-19. The date of last close contact is considered day 0.    Wear a well-fitting mask for 10 days when around others at home or in public.    Watch for fever, cough, shortness of breath, or other COVID-19 symptoms for 10 days after your last close contact with someone with COVID-19.    If you develop symptoms, get tested right away.    If you don't develop symptoms, get tested at least 5 days after your last close contact with someone with COVID-19.   For more information about exposure, isolation, and quarantine, see https://www.cdc.gov/coronavirus/2019-ncov/your-health/quarantine-isolation.html  .   General information about COVID-19   What should I do if I'm exposed to someone with COVID-19?   In general, you need to be in close contact with someone who has COVID-19 to get infected. Close contact means:    Living in the same household as someone with COVID-19.    Caring for someone with COVID-19.    Being within 6 feet of someone with COVID-19 for a total of at least 15 minutes over a 24-hour period.    Being in direct contact with respiratory droplets from someone with COVID-19 (for example, being coughed on, kissing, or sharing utensils).   Everyone who tests positive for COVID, regardless of vaccination or booster status, should:    Stay home for at least 5 days. Day 1 is the first full day after your symptoms started. If you never develop symptoms, day 1 is the first full day after the sample was taken for the test.    If you have  symptoms, continue to stay home until you're fever free for 24 hours without the use of fever-reducing medicines and your other symptoms have improved.    If you never develop symptoms, you may leave your house after day 5.    You should wear a well-fitting mask around others at home and in public until day 10, even if you don't have symptoms or if your symptoms are improving.    Don't travel until after at least day 10.    Don't go to places where you can't wear a mask, such as restaurants, bars, or gyms.   If you're exposed to COVID, follow the advice below based on your vaccination status.   If any of these apply:    You're 18 or older and have had all recommended vaccine doses, including boosters and additional primary shots for certain immunocompromised people    You're 5 to 17 years old and have had 2 doses of the Pfizer vaccine    You've had COVID-19 within the last 90 days, confirmed by a nose or throat swab test   Then:    Wear a well-fitting mask around others for 10 days. The date of last close contact is considered day 0.    Get tested at least 5 days after you last had close contact with someone with COVID-19.   If any of these apply:    You're 18 or older, have had two doses of the Pfizer or Moderna vaccine, but have NOT gotten a recommended booster shot    You got a dose of the J&J/Uriel vaccine more than 2 months ago, but you have NOT gotten a recommended booster shot    You're not vaccinated   Then:    Stay home for at least 5 days after your last close contact with a person who has COVID-19. The date of last close contact is considered day 0.    Wear a well-fitting mask for 10 days when around others at home or in public.    Watch for fever, cough, shortness of breath, or other COVID-19 symptoms for 10 days after your last close contact with someone with COVID-19.    If you develop symptoms, get tested right away.    If you don't develop symptoms, get tested at least 5 days after your last close  contact with someone with COVID-19.   What if I develop symptoms of COVID-19?   Get tested right away if you develop symptoms.   Everyone who tests positive, regardless of vaccination or booster status, should:    Stay home for at least 5 days. Day 1 is the first full day after your symptoms started. If you never develop symptoms, day 1 is the first full day after the sample was taken for the test.    If you have symptoms, continue to stay home until you're fever free for 24 hours without the use of fever-reducing medicines and your other symptoms have improved.    If you never develop symptoms, you may leave your house after day 5.    You should wear a well-fitting mask around others at home and in public until day 10, even if you don't have symptoms or if your symptoms are improving.    Don't travel until after at least day 10.    Don't go to places where you can't wear a mask, such as restaurants, bars, or gyms.   CALL your healthcare provider or clinic right away to discuss next steps if you have any of these risk factors:    Age 65 or older    Pregnant    Chronic medical condition such as diabetes, liver disease, kidney disease requiring dialysis, heart disease, high blood pressure, severe obesity, or lung disease (including moderate to severe asthma)    A medical condition that affects your immune system    Taking a medication that affects your immune system   Otherwise, if your symptoms are mild, you don't need to call your healthcare provider or be seen for an exam. You can recover at home. Over-the-counter cold medications can help ease symptoms.   To prevent the spread of COVID-19 to the people and animals in your household:    Stay in a specific room away from other people in your home, and use a separate bathroom if possible    Wear a mask when close contact with household members can't be avoided   When to get care   Call your healthcare provider immediately if you have any of the following:    Fever  over 103F    Fever that doesn't come down after taking medications such as Tylenol or ibuprofen    Fever that returns after being gone for more than 24 hours    Fever lasting more than 4 days    Worsening shortness of breath or difficulty breathing   Go to your nearest ER or call 911 if you have any of the following:    Shortness of breath that makes it hard to do simple things like get dressed, bathe, or comb your hair    Persistent chest pain or chest tightness    New confusion or difficulty staying alert    Bluish color to the lips or face    Flu vaccine information   Getting a flu vaccine this year is more important than ever. The vaccine not only protects you and the people around you from the flu, it also helps reduce the strain on healthcare systems responding to the COVID-19 pandemic.   Who should get a flu vaccine?   Everyone 6 months of age and older should get a yearly flu vaccine.   When should I get vaccinated?   You should get a flu vaccine by the end of October. Once you're vaccinated, it takes about two weeks for antibodies to develop and protect you against the flu. That's why it's important to get vaccinated as soon as possible.   After October, is it too late to get vaccinated?   No. You should still get vaccinated. As long as the flu viruses are still in your community, flu vaccines will remain available, even into January of next year or later.   Why do I need a flu vaccine EVERY year?   Flu viruses are constantly changing, so flu vaccines are usually updated from one season to the next. Your protection from the flu vaccine also lessens over time.   Is the flu vaccine safe?   Yes. Over the last 50 years, hundreds of millions of Americans have safely received the flu vaccines.   What are the side effects of flu vaccines?   You CANNOT get the flu from a flu vaccine. Common side effects of the flu shot include soreness, redness and/or swelling where the shot was given, low grade fever, and aches.  Common side effects of the nasal spray flu vaccine for adults include runny nose, headaches, sore throat, and cough. For children, side effects include wheezing, vomiting, muscle aches, and fever.   Does the flu vaccine increase your risk of getting COVID-19?   No. There is no evidence that getting a flu vaccine increases your risk of getting COVID-19.   Is it safe to get the flu vaccine along with a COVID-19 vaccine?   Yes. It's safe to get the flu vaccine with a COVID-19 vaccine or booster.   Contact your healthcare provider TODAY for details on when and where to get your flu vaccine.   Your provider   Your diagnosis was provided by Cathy Shepherd, a member of your trusted care team at [facility].   If you have any questions, call us at [phone]  .

## 2022-03-30 NOTE — E-VISIT TREATED
Chief Complaint: Coronavirus (COVID-19), cold, sinus pain, allergy, or flu   Patient introduction   Patient is 26-year-old female who reports cough and congestion that started < 48 hours ago.   When asked why they're seeking care online today, patient reports they want to know if they have a cold or something more serious and just want to feel better.   Patient has not requested COVID testing.   COVID-19 exposure, testing history, and vaccination status:    No known exposure to a confirmed or suspected case of COVID-19.    No recent travel outside of their local community.    Patient had a viral lab test 1-3 months ago. Test result was positive.    Reports receiving 3 doses of the COVID-19 vaccine (Moderna, Moderna, Moderna). Received their most recent dose of the vaccine > 14 days ago.   Risk factors for severe disease from COVID-19 infection:    BMI >= 25    Asthma    Chronic lung disease    Pregnancy   Warning. The following may warrant further investigation:    Pregnancy   Patient did not request an excuse note.   General presentation   Symptoms came on suddenly.   Fever:    Denies fever.   Sinus and nasal symptoms:    Reports yellow nasal drainage.    Nasal drainage is thick.    Reports postnasal drip.    Reports congestion without associated pain or pressure.    Denies rhinitis.    Denies itchy nose or sneezing.   Sore throat:    Denies sore throat.   Head and body aches:    Denies headache.    Denies sweats.    Denies chills.    Denies myalgia.    Denies fatigue.   Cough:    Reports cough.    Cough is worse in the morning, during the day, and at night/while sleeping.    Cough is mildly productive of sputum.    Uncertain of color of mucus.   Wheezing and SOB:    Reports having asthma.    Current cold symptoms aggravate their asthma.    Reports using an albuterol inhaler and an inhaled steroid with long-acting bronchodilator for asthma.    Reports using albuterol every 4-6 hours.    Denies COPD diagnosis.     Denies wheezing.    Denies shortness of breath.   Chest pain:    Denies chest pain.   Dizziness:    Denies dizziness.   Allergies:    Reports history of allergies.    Patient does not think symptoms are allergy-related.    Patient has known seasonal allergies, known pet allergies, and known dust allergies.   Flu exposure:    Reports recent exposure to confirmed household flu diagnosis.    Reports a flu vaccine in the last 1-3 months.   Patient denies the following red flags:    Changes in alertness or awareness    Symptoms suggesting respiratory distress    Decreased urination   Pregnancy/menstrual status/breastfeeding:    Patient is pregnant    Denies breastfeeding   Self-exam:    No difficulty moving their chin toward their chest    Neck lymph nodes feel normal    Denies antibiotic treatment for similar symptoms within the past month   Current medications   Denies taking over-the-counter medication for current symptoms.   Reports taking Stahist and Chromagen (prenatal).   Medication allergies   None.   Medication contraindication review   Because of patient's reported pregnancy/menstrual status, the following medication(s) will not be prescribed: baloxavir, doxycycline hyclate, doxycycline monohydrate, fluconazole, ibuprofen, acetaminophen-diphenhydramine-phenylephrine, and aspirin-chlorpheniramine-phenylephrine.   Denies history of anaphylactic reaction to beta-lactam antibiotics; aspirin triad; blood dyscrasia; bone marrow depression; catecholamine-releasing paraganglioma; coronary artery disease; coagulation disorder; congenital long QT syndrome; depression; electrolyte abnormalities; fungal infection; GI bleeding; GI obstruction; G6PD deficiency; heart arrhythmia; hypertension; kidney disease or hemodialysis; mononucleosis; myasthenia; recent myocardial infarction; NSAID-induced asthma/urticaria; Parkinson's disease; pheochromocytoma; porphyria; Reye syndrome; seizure disorder; ulcerative colitis; and urinary  retention.   Denies history of metoclopramide-associated dystonic reaction and tardive dyskinesia.   No known history of amoxicillin-clavulanate-associated cholestatic jaundice or hepatic impairment.   No known history of azithromycin-associated cholestatic jaundice or hepatic impairment.   Past medical history   Immune conditions: Denies immunocompromising conditions. Denies history of cancer.   Social history   High-risk household contacts: Patient's household includes one or more members of a group with risk factors for influenza complications, including a child < 5 years.   Non-smoker.   Assessment   Acute nasopharyngitis (common cold).   [facility] required information for COVID-19 lab data reporting (if test is ordered):   Symptoms:    Cough    Nasal congestion   Symptom onset: < 48 hours ago ago  Pregnancy: Patient is pregnant  Healthcare worker? No  Resident in a congregate care setting? No  Previous history of COVID-19 testing: Patient had a viral lab test 1-3 months ago. Test result was positive.     Plan   Medications:    benzonatate 100 mg capsule RX 100mg 1 cap PO tid PRN 7d for cough. Do not chew or cut these capsules. Amount is 21 cap.    Mucus Relief  mg tablet, extended release RX 600mg 1 tab PO q12h PRN 7d for cough and congestion. Amount is 14 tab.   The patient's prescriptions will be sent to:   [id number]   [address] [id number]   Phone: [phone]     Fax: [phone]   Education:    Condition and causes    Prevention    Treatment and self-care    When to call provider      ----------   Electronically signed by EVELIN Casas on 2022-03-30 at 12:57PM   ----------   Patient Interview Transcript:   Why are you getting care through eVisit today? We can't guarantee a specific treatment or test. Your provider will decide what's best for you. Select all that apply.    I want to know if I have a cold or something more serious    I just want to feel  better!   Not selected:    I want to know if I need to be seen by a provider    I need a doctor's note    I want to be tested for COVID-19    I want to get the COVID-19 vaccine    I think I'm having side effects from the COVID-19 vaccine    None of the above   COVID-19 symptoms are similar to symptoms of other illnesses. This makes it difficult to diagnose. Please carefully consider each question and answer as best you can. This will help your provider make the right diagnosis. Which of these symptoms are bothering you? Select all that apply.    Cough    Stuffed-up nose or sinuses   Not selected:    Shortness of breath    Fever    Runny nose    Itchy or watery eyes    Itchy nose or sneezing    Loss of smell or taste    Sore throat    Hoarse voice or loss of voice    Headache    Sweats    Chills    Muscle or body aches    Fatigue or tiredness    Nausea or vomiting    Diarrhea    I don't have any of these symptoms   Do you have any of these conditions? These conditions can put you at increased risk for severe disease if you're infected with COVID-19. Select all that apply.    Chronic lung disease, such as cystic fibrosis or interstitial fibrosis   Not selected:    Heart disease, such as congenital heart disease, congestive heart failure, or coronary artery disease    Disorder of the brain, spinal cord, or nerves and muscles, such as dementia, cerebral palsy, epilepsy, muscular dystrophy, or developmental delay    Metabolic disorder or mitochondrial disease    Cerebrovascular disease, such as stroke or another condition affecting the blood vessels or blood supply to the brain    Down syndrome    Mood disorder, including depression or schizophrenia spectrum disorders    Substance use disorder, such as alcohol, opioid, or cocaine use disorder    Tuberculosis    None of the above   Do you live in a group care setting? Examples include: - Nursing home - Residential care - Psychiatric treatment facility - Group home -  Dormitory - Board and care home - Homeless shelter - Foster care setting Select one.    No   Not selected:    Yes   Have you ever been tested for COVID-19? Select one.    Yes   Not selected:    No   When was your most recent COVID-19 test? Select one.    1 to 3 months ago   Not selected:    Within the last week (specify date in MM/DD/YYYY format)    7 to 14 days ago    15 to 30 days ago    More than 3 months ago   What type of COVID-19 test did you most recently have? There are two types of COVID-19 tests: - Viral tests check if you're currently infected with COVID-19. For these tests, a nose swab or saliva sample is taken. Viral tests include self-tests and tests done at a doctor's office, lab, or testing site. - Antibody tests check if you've been infected in the past. For these tests, your blood is drawn. Antibody tests can only be done at a doctor's office, lab, or testing site. Select one.    Viral test at a doctor's office, lab, or testing site   Not selected:    Viral self-test    Antibody test   What was the result of your most recent COVID-19 test? Select one.    Positive   Not selected:    Negative    I'm not sure   Have you gotten the COVID-19 vaccine? Select one.    Yes   Not selected:    No   How many doses of the COVID-19 vaccine have you gotten? This includes boosters. Select one.    3 doses   Not selected:    1 dose    2 doses   Which COVID-19 vaccine did you get for your first dose? Check your Vaccination Record Card under Product Name/. Select one.    Moderna   Not selected:    Riccardo & Riccardo's Uriel Vaccine (J&J[facility])    Pfizer-BioNTech (Pfizer)   Which COVID-19 vaccine did you get for your second dose? Check your Vaccination Record Card under Product Name/. Select one.    Moderna   Not selected:    Riccardo & Riccardo's Uriel Vaccine (J&J/Uriel)    Pfizer-BioNTech (Pfizer)   Which COVID-19 vaccine did you get for your third dose? Check your Vaccination Record  "Card under Product Name/. Select one.    Moderna   Not selected:    Riccardo & Riccardo's Uriel Vaccine (J&J/Uriel)    Pfizer-BioNTech (Pfizer)   When did you get your most recent dose of the COVID-19 vaccine?    More than 14 days ago   Not selected:    Less than 48 hours (2 days) ago    48 to 72 hours (3 days) ago    3 to 5 days ago    5 to 7 days ago    7 to 14 days ago   In the last 14 days, have you traveled outside of your local community? This includes travel by car, RV, bus, train, or plane. Travel increases your chances of getting and spreading COVID-19. Select one.    No   Not selected:    Yes   In the last 14 days, have you had close contact with someone who has coronavirus (COVID-19)? \"Close contact\" means any of these: - Living in the same household as someone with COVID-19. - Caring for someone with COVID-19. - Being within 6 feet of someone with COVID-19 for a total of at least 15 minutes over a 24-hour period. For example, three 5-minute exposures for a total of 15 minutes. - Being in direct contact with respiratory droplets from someone with COVID-19 (being coughed on, kissing, sharing utensils). Select one.    No, not that I know of   Not selected:    Yes, a confirmed case    Yes, a suspected case   When did your current symptoms start? If you know the exact date your symptoms started, choose Other and enter the month and day. Select one.    Less than 48 hours ago   Not selected:    3 to 5 days ago    6 to 9 days ago    10 to 14 days ago    2 to 4 weeks ago    More than a month ago    Other (specify)   Did your symptoms come on suddenly or gradually? Select one.    Suddenly   Not selected:    Gradually    I'm not sure   Do you cough so hard that it's made you gag or vomit? By gag, we mean has your coughing made you choke or dry heave? Select all that apply.    Yes, my coughing has made me gag   Not selected:    Yes, my coughing has made me vomit    No   When is your cough the worst? " "Select all that apply.    In the morning, or when I wake up    During the day    At nighttime, or while I'm sleeping   Not selected:    I'm not sure   Are you coughing up mucus or phlegm? Select one.    Yes, a little   Not selected:    No, my cough is dry    Yes, a lot   What color is most of the mucus or phlegm that you're coughing up? Select one.    I'm not sure   Not selected:    Clear    White/frothy    Yellow    Green    Red or pink   You mentioned having a stuffy nose or sinus congestion. Do you feel pain or pressure in your sinuses?    No   Not selected:    Yes    I'm not sure   What color is your nasal drainage? Select one.    Yellow   Not selected:    Clear    White    Green    My nose is stuffed but not draining or running    I'm not sure   Is your nasal drainage thick or thin? Select one.    Thick   Not selected:    Thin    I'm not sure   Is there any drainage (mucus) going down the back of your throat? This kind of drainage is also called \"postnasal drip.\" Select one.    Yes   Not selected:    No    I'm not sure   Since your symptoms started, have you felt dizzy? Select one.    No   Not selected:    Yes, but I can continue with my regular daily activities    Yes, and it makes it hard to stand, walk, or do daily activities   Do you have chest pain? You might also feel it as discomfort, aching, tightness, or squeezing in the chest. Select one.    No   Not selected:    Yes   Have you urinated at least 3 times in the last 24 hours? Select one.    Yes   Not selected:    No    I'm not sure   Changes in alertness or awareness may mean you need emergency care. Since your symptoms started, have you had any of these? Select all that apply.    None of the above   Not selected:    Confusion    Slurred speech    Not knowing where you are or what day it is    Difficulty staying conscious    Fainting or passing out   Do your symptoms include a whistling sound, or wheezing, when you breathe? Select one.    No   Not " selected:    Yes    I'm not sure   Do you have any of these symptoms in your ear(s)? Select all that apply.    None of the above   Not selected:    Pain    Pressure    Fullness    Crackling or popping    Plugged or blocked sensation   Can you move your chin toward your chest?    Yes   Not selected:    No, my neck is too stiff   Are your glands/lymph nodes swollen, or does it hurt when you touch them?    No   Not selected:    Yes    I'm not sure   People with a very high body mass index (BMI) are at higher risk for developing complications from the flu and severe illness from COVID-19. To determine your BMI, we need to know your weight and height. Please enter your weight (in pounds).    Weight   Please enter your height.    Height   In the past week, has anyone around you (such as at school, work, or home) had a confirmed diagnosis of the flu? A confirmed diagnosis means that a nose swab was done to verify a flu infection. Select all that apply.    I live with someone who has the flu   Not selected:    I've been within touching distance of someone who has the flu    I've walked by, or sat about 3 feet away from, someone who has the flu    I've been in the same building as someone who has the flu    I'm not sure    No   Have you ever been diagnosed with asthma? Select one.    Yes   Not selected:    No   Are your cold symptoms making your asthma worse? Select one.    Yes   Not selected:    No   What medications or inhalers are you currently using for your asthma? Select all that apply.    Albuterol inhaler, as needed (such as ProAir, Proventil, Ventolin)    Inhaled steroid with long-acting bronchodilator (such as Advair, Dulera, Symbicort)   Not selected:    Inhaled steroid (such as Qvar, Pulmicort, Flovent)    I'm not sure    I'm not taking any medications for my asthma    I usually take medications for my asthma, but I ran out   How often are you using albuterol? If you're using albuterol very frequently, you'll  need to be seen in person. Select one.    Every 4 to 6 hours   Not selected:    More than once an hour    Every 1 to 2 hours    Every 2 to 3 hours    Every 3 to 4 hours    Every 6 hours or longer   Would you like a prescription for albuterol? Select one.    No   Not selected:    Yes   Have you ever been diagnosed with chronic obstructive pulmonary disease (COPD)? Select one.    No   Not selected:    Yes    I'm not sure   Do you have allergies (pollen, dust mites, mold, animal dander)? Select one.    Yes   Not selected:    No    I'm not sure   What kind of allergies do you have? Select all that apply.    Seasonal allergies (hay fever)    Pet allergies    Dust allergies   Not selected:    None of the above    I'm not sure   Do you think your symptoms could be allergy-related? Select one.    No   Not selected:    Yes    I'm not sure   Have you had a flu shot this season? Select one.    Yes, 1 to 3 months ago   Not selected:    Yes, less than 2 weeks ago    Yes, 2 to 4 weeks ago    Yes, 3 to 6 months ago    Yes, more than 6 months ago    I'm not sure    No   The flu and COVID-19 can be more serious for people with certain conditions or characteristics. These questions help us figure out if you or anyone you live with is at higher risk for complications from these infections. Do either of these statements apply to you? Select all that apply.    None of the above   Not selected:    I'm  or Native [place]    I'm a healthcare worker   Do you smoke tobacco? Select one.    No, never   Not selected:    Yes, every day    Yes, some days    No, I quit   Some conditions can put you at risk for more serious infections. Do any of these apply to you? Select all that apply.    None of the above   Not selected:    I've been hospitalized within the last 5 days    I have diabetes    I'm in close contact with a child in    Are you currently being treated for any of these conditions? Scroll to see all options. Select  all that apply.    None of the above   Not selected:    Aspirin triad (also known as Samter's triad or ASA triad)    Asthma or hives from taking aspirin or other NSAIDs, such as ibuprofen or naproxen    Blockage or narrowing of the blood vessels of the heart    Blood dyscrasia, such anemia, leukemia, lymphoma, or myeloma    Bone marrow depression    Catecholamine-releasing paraganglioma    Blood clotting disorder    Congenital long QT syndrome    Depression    Difficulty urinating or completely emptying your bladder    Uncorrected electrolyte abnormalities    Fungal infection    Gastrointestinal (GI) bleeding    Gastrointestinal (GI) obstruction    G6PD deficiency    Recent heart attack    High blood pressure    Irregular heartbeat or heart rhythm    Kidney disease or hemodialysis    Mononucleosis (mono)    Myasthenia gravis    Parkinson's disease    Pheochromocytoma    Reye syndrome    Seizure disorder    Ulcerative colitis   Do you have any of these conditions that can affect the immune system? Scroll to see all options. Select all that apply.    None of these   Not selected:    History of bone marrow transplant    Chronic kidney disease    Chronic liver disease (including cirrhosis)    HIV/AIDS    Inflammatory bowel disease (Crohn's disease or ulcerative colitis)    Lupus    Moderate to severe plaque psoriasis    Multiple sclerosis    Rheumatoid arthritis    Sickle cell anemia    Alpha or beta thalassemia    History of solid organ transplant (kidney, liver, or heart)    History of spleen removal    An autoimmune disorder not listed here    A condition requiring treatment with long-term use of oral steroids (such as prednisone, prednisolone, or dexamethasone)   Have you ever been diagnosed with cancer? Select one.    No   Not selected:    Yes, I have cancer now    Yes, but I'm in remission   Have you ever had either of these conditions? Select all that apply.    No   Not selected:    Metoclopramide-associated  dystonic reaction    Tardive dyskinesia   Do any of these apply to the people who live with you? Select all that apply.    A child under the age of 5   Not selected:    An adult 65 or older    A person who is pregnant    A person who has given birth, had a miscarriage, had a pregnancy loss, or had an  in the last 2 weeks    An  or Native [place]    None of the above   Does any member of your household have any of these medical conditions? Select all that apply.    None of the above   Not selected:    Asthma    Disorders of the brain, spinal cord, or nerves and muscles, such as dementia, cerebral palsy, epilepsy, muscular dystrophy, or developmental delay    Chronic lung disease, such as COPD or cystic fibrosis    Heart disease, such as congenital heart disease, congestive heart failure, or coronary artery disease    Cerebrovascular disease, such as stroke or another condition affecting the blood vessels or blood supply to the brain    Blood disorders, such as sickle cell disease    Diabetes    Metabolic disorders such as inherited metabolic disorders or mitochondrial disease    Kidney disorders    Liver disorders    Weakened immune system due to illness or medications such as chemotherapy or steroids    Children under the age of 19 who are on long-term aspirin therapy    Extreme obesity (BMI > 40)   Are you pregnant? Select one.    Yes   Not selected:    No   Are you breastfeeding? Select one.    No   Not selected:    Yes   Just a few more questions about medications, and then you're finished. Have you used any non-prescription medications or nasal sprays for your current symptoms? Examples include saline sprays, decongestants, NyQuil, and Tylenol. Select one.    No   Not selected:    Yes   In the past month, have you taken antibiotics for similar symptoms? Examples of antibiotics include amoxicillin, amoxicillin-clavulanate (Augmentin), penicillin, cefdinir (Omnicef), doxycycline, and  clindamycin (Cleocin). Select one.    No   Not selected:    Yes    I'm not sure   Have you taken any monoamine oxidase inhibitor (MAOI) medications in the last 14 days? Examples include rasagiline (Azilect), selegiline (Eldepryl, Zelapar), isocarboxazid (Marplan), phenelzine (Nardil), and tranylcypromine (Parnate). Select one.    No   Not selected:    Yes    I'm not sure   Do you take Kynmobi or Apokyn (apomorphine)? Select one.    No   Not selected:    Yes    I'm not sure   Are you taking any other medications or supplements? On the next screen, you need to list all vitamins, supplements, non-prescription medications (such as aspirin or Aleve), and prescription medications that you're taking. Select one.    Yes   Not selected:    Yes, but I'm not sure what they are    No   Have you ever had an allergic or bad reaction to any medication? Select one.    No   Not selected:    Yes   Are you allergic to milk or to the proteins found in milk (for example, whey or casein)? A milk allergy is different from lactose intolerance. Select one.    No   Not selected:    Yes    I'm not sure   Have you ever had jaundice or liver problems as a result of taking amoxicillin-clavulanate (Augmentin)? Jaundice is a condition in which the skin and the whites of the eyes turn yellow. Select all that apply.    No, not that I know of   Not selected:    Yes, jaundice    Yes, liver problems   Have you ever had jaundice or liver problems as a result of taking azithromycin (Zithromax, Zmax)? Jaundice is a condition in which the skin and the whites of the eyes turn yellow. Select all that apply.    No, not that I know of   Not selected:    Yes, jaundice    Yes, liver problems   Do you need a doctor's note? A doctor's note confirms that you received care today and states when you can return to school or work. It does not contain information about your diagnosis or treatment plan. Your provider will make the final decision on whether to give you a  doctor's note and for how long. Doctor's notes CANNOT be backdated. We can't provide medical leave paperwork through this type of visit. If more paperwork is needed to request time off, contact your primary care provider. Select one.    No   Not selected:    Today only (1 day)    Today and tomorrow (2 days)    3 days    7 days    10 days    14 days   Is there anything else you'd like to tell us about your symptoms?   The patient did not enter any additional information.   ----------   Medical history   Medical history data does not currently exist for this patient.

## 2022-04-22 ENCOUNTER — TELEMEDICINE (OUTPATIENT)
Dept: INTERNAL MEDICINE | Facility: CLINIC | Age: 27
End: 2022-04-22

## 2022-04-22 DIAGNOSIS — J45.50 SEVERE PERSISTENT ASTHMA, UNSPECIFIED WHETHER COMPLICATED: Primary | ICD-10-CM

## 2022-04-22 DIAGNOSIS — J30.2 SEASONAL ALLERGIES: ICD-10-CM

## 2022-04-22 PROCEDURE — 99214 OFFICE O/P EST MOD 30 MIN: CPT | Performed by: INTERNAL MEDICINE

## 2022-04-22 RX ORDER — CHLORCYCLIZINE HYDROCHLORIDE AND PSEUDOEPHEDRINE HYDROCHLORIDE 25; 60 MG/1; MG/1
1 TABLET ORAL DAILY
Qty: 90 TABLET | Refills: 0 | Status: SHIPPED | OUTPATIENT
Start: 2022-04-22 | End: 2022-09-07

## 2022-04-22 RX ORDER — MOMETASONE FUROATE AND FORMOTEROL FUMARATE DIHYDRATE 50; 5 UG/1; UG/1
2 AEROSOL RESPIRATORY (INHALATION)
Qty: 13 G | Refills: 3 | Status: SHIPPED | OUTPATIENT
Start: 2022-04-22 | End: 2022-09-07

## 2022-04-22 NOTE — PROGRESS NOTES
Chief Complaint  No chief complaint on file.    Subjective    Prachi aBiley is a 26 y.o. female.     Prachi Bailey presents to [facility] for       History of Present Illness    Patient is consented for video MyChart    1 asthma- patient was seen approximately 3 to 4 weeks ago with concerns of asthma management she normally takes Symbicort which has done very well for her but due to insurance she can no longer afford it patient was then switched to Pulmicort or budesonide along with albuterol rescue inhaler.  Patient says that this provides minimal improvement but she still has shortness of breath occasionally with exertion and occasionally with coughing and wheezing    Review of Systems   All other systems reviewed and are negative.      Objective   Vital Signs:   There were no vitals taken for this visit.    There is no height or weight on file to calculate BMI.    Physical Exam  Vitals and nursing note reviewed.   Constitutional:       Appearance: She is normal weight.   HENT:      Head: Normocephalic and atraumatic.      Right Ear: Tympanic membrane normal.      Nose: Nose normal.      Mouth/Throat:      Mouth: Mucous membranes are moist.   Eyes:      Extraocular Movements: Extraocular movements intact.      Pupils: Pupils are equal, round, and reactive to light.   Cardiovascular:      Rate and Rhythm: Normal rate.      Pulses: Normal pulses.   Genitourinary:     General: Normal vulva.   Musculoskeletal:      Cervical back: Normal range of motion and neck supple.   Neurological:      Mental Status: She is alert.               Assessment and Plan  Diagnoses and all orders for this visit:        Diagnoses and all orders for this visit:    1. Severe persistent asthma, unspecified whether complicated (Primary)  -     mometasone-formoterol (Dulera) 50-5 MCG/ACT inhaler; Inhale 2 puffs 2 (Two) Times a Day.  Dispense: 13 g; Refill: 3    Discontinue the  Pulmicort    Anticipatory guidance: I did have a discussion with Prachi telling her that even though the inhalers are a category C the benefit is making sure that her asthma is controlled and she is in agreement with that.  We will go ahead and substitute with the Dulera and see if this helps with her asthma symptoms.

## 2022-09-06 DIAGNOSIS — J30.2 SEASONAL ALLERGIES: ICD-10-CM

## 2022-09-06 DIAGNOSIS — J45.50 SEVERE PERSISTENT ASTHMA, UNSPECIFIED WHETHER COMPLICATED: ICD-10-CM

## 2022-09-07 RX ORDER — CHLORCYCLIZINE HYDROCHLORIDE AND PSEUDOEPHEDRINE HYDROCHLORIDE 25; 60 MG/1; MG/1
1 TABLET ORAL DAILY
Qty: 90 TABLET | Refills: 0 | Status: SHIPPED | OUTPATIENT
Start: 2022-09-07 | End: 2022-12-14

## 2022-09-07 RX ORDER — MOMETASONE FUROATE AND FORMOTEROL FUMARATE DIHYDRATE 50; 5 UG/1; UG/1
AEROSOL RESPIRATORY (INHALATION)
Qty: 13 G | Refills: 2 | Status: SHIPPED | OUTPATIENT
Start: 2022-09-07 | End: 2022-12-14

## 2022-12-13 DIAGNOSIS — J45.50 SEVERE PERSISTENT ASTHMA, UNSPECIFIED WHETHER COMPLICATED: ICD-10-CM

## 2022-12-13 DIAGNOSIS — J30.2 SEASONAL ALLERGIES: ICD-10-CM

## 2022-12-14 RX ORDER — MOMETASONE FUROATE AND FORMOTEROL FUMARATE DIHYDRATE 50; 5 UG/1; UG/1
AEROSOL RESPIRATORY (INHALATION)
Qty: 13 G | Refills: 1 | Status: SHIPPED | OUTPATIENT
Start: 2022-12-14 | End: 2023-02-17 | Stop reason: SDUPTHER

## 2022-12-14 RX ORDER — CHLORCYCLIZINE HYDROCHLORIDE AND PSEUDOEPHEDRINE HYDROCHLORIDE 25; 60 MG/1; MG/1
TABLET ORAL
Qty: 90 TABLET | Refills: 0 | Status: SHIPPED | OUTPATIENT
Start: 2022-12-14 | End: 2023-01-25 | Stop reason: SDUPTHER

## 2023-01-25 DIAGNOSIS — J30.2 SEASONAL ALLERGIES: ICD-10-CM

## 2023-01-25 RX ORDER — CHLORCYCLIZINE HYDROCHLORIDE AND PSEUDOEPHEDRINE HYDROCHLORIDE 25; 60 MG/1; MG/1
1 TABLET ORAL DAILY
Qty: 90 TABLET | Refills: 0 | Status: SHIPPED | OUTPATIENT
Start: 2023-01-25

## 2023-02-17 DIAGNOSIS — J45.50 SEVERE PERSISTENT ASTHMA, UNSPECIFIED WHETHER COMPLICATED: ICD-10-CM

## 2023-02-20 RX ORDER — MOMETASONE FUROATE AND FORMOTEROL FUMARATE DIHYDRATE 50; 5 UG/1; UG/1
2 AEROSOL RESPIRATORY (INHALATION)
Qty: 13 G | Refills: 1 | Status: SHIPPED | OUTPATIENT
Start: 2023-02-20

## 2023-04-26 DIAGNOSIS — J45.50 SEVERE PERSISTENT ASTHMA, UNSPECIFIED WHETHER COMPLICATED: ICD-10-CM

## 2023-04-27 RX ORDER — MOMETASONE FUROATE AND FORMOTEROL FUMARATE DIHYDRATE 50; 5 UG/1; UG/1
AEROSOL RESPIRATORY (INHALATION)
Qty: 13 G | Refills: 0 | Status: SHIPPED | OUTPATIENT
Start: 2023-04-27

## 2023-05-22 ENCOUNTER — TELEPHONE (OUTPATIENT)
Dept: INTERNAL MEDICINE | Facility: CLINIC | Age: 28
End: 2023-05-22
Payer: COMMERCIAL

## 2023-05-22 DIAGNOSIS — J45.20 MILD INTERMITTENT ASTHMA, UNSPECIFIED WHETHER COMPLICATED: Primary | ICD-10-CM

## 2023-05-22 RX ORDER — ALBUTEROL SULFATE 2.5 MG/3ML
2.5 SOLUTION RESPIRATORY (INHALATION) EVERY 4 HOURS PRN
Qty: 30 EACH | Refills: 2 | Status: SHIPPED | OUTPATIENT
Start: 2023-05-22

## 2023-05-22 NOTE — TELEPHONE ENCOUNTER
Nebulizer solution has been called in but if her symptoms do not improve or progressively get worse then she needs to seek medical attention.    Please make follow-up if her asthma symptoms are not improving

## 2023-05-22 NOTE — TELEPHONE ENCOUNTER
Caller: Prachi Bailey    Relationship: Self    Best call back number: [phone]    What medication are you requesting: NEBULIZER SOLUTION    What are your current symptoms: SHORTNESS OF BREATH    How long have you been experiencing symptoms: N/A    Have you had these symptoms before:    [x] Yes  [] No    Have you been treated for these symptoms before:   [x] Yes  [] No    If a prescription is needed, what is your preferred pharmacy and phone number: [facility] - [address] - [phone]  - [phone]      Additional notes: PATIENT STATES THAT HER MOTHER HAS PASSED AWAY RECENTLY AND HAS BEEN FEELING VERY SHORT OF BREATH

## 2023-05-26 DIAGNOSIS — J45.50 SEVERE PERSISTENT ASTHMA, UNSPECIFIED WHETHER COMPLICATED: ICD-10-CM

## 2023-05-26 RX ORDER — MOMETASONE FUROATE AND FORMOTEROL FUMARATE DIHYDRATE 50; 5 UG/1; UG/1
AEROSOL RESPIRATORY (INHALATION)
Qty: 13 G | Refills: 3 | Status: SHIPPED | OUTPATIENT
Start: 2023-05-26

## 2023-09-30 DIAGNOSIS — J45.50 SEVERE PERSISTENT ASTHMA, UNSPECIFIED WHETHER COMPLICATED: ICD-10-CM

## 2023-10-02 RX ORDER — MOMETASONE FUROATE AND FORMOTEROL FUMARATE DIHYDRATE 50; 5 UG/1; UG/1
AEROSOL RESPIRATORY (INHALATION)
Qty: 13 G | Refills: 2 | Status: SHIPPED | OUTPATIENT
Start: 2023-10-02

## 2023-10-25 DIAGNOSIS — J30.2 SEASONAL ALLERGIES: ICD-10-CM

## 2023-10-26 RX ORDER — CHLORCYCLIZINE HYDROCHLORIDE AND PSEUDOEPHEDRINE HYDROCHLORIDE 25; 60 MG/1; MG/1
1 TABLET ORAL DAILY
Qty: 90 TABLET | Refills: 2 | Status: SHIPPED | OUTPATIENT
Start: 2023-10-26

## 2023-11-06 ENCOUNTER — OFFICE VISIT (OUTPATIENT)
Dept: INTERNAL MEDICINE | Facility: CLINIC | Age: 28
End: 2023-11-06
Payer: COMMERCIAL

## 2023-11-06 VITALS
DIASTOLIC BLOOD PRESSURE: 84 MMHG | SYSTOLIC BLOOD PRESSURE: 142 MMHG | HEART RATE: 72 BPM | RESPIRATION RATE: 18 BRPM | TEMPERATURE: 98.2 F | BODY MASS INDEX: 32.99 KG/M2 | WEIGHT: 198.25 LBS

## 2023-11-06 DIAGNOSIS — E66.9 OBESITY WITH BODY MASS INDEX OF 30.0-39.9: Primary | ICD-10-CM

## 2023-11-06 PROCEDURE — 99213 OFFICE O/P EST LOW 20 MIN: CPT | Performed by: INTERNAL MEDICINE

## 2023-11-06 NOTE — PROGRESS NOTES
"Chief Complaint  Med Refill (Follow up)    Subjective    Prachi Bailey is a 28 y.o. female.     Prachi Bailey presents to [facility] for  medication follow-up and any other medical issues or concerns.      History of Present Illness  1. Weight gain. - The patient reports that her main concern was trying to control her weight gain.  She did well for 2 to 3 weeks after her last appointment, and she was tracking when she ate and walking. She reports that once she started back at work and school, the stress \"knocked her back down.\" She denies losing any weight.    The following portions of the patient's history were reviewed and updated as appropriate: allergies, current medications, past family history, past medical history, past social history, past surgical history, and problem list.    Review of Systems    Objective   Vital Signs:   /84 (BP Location: Right arm, Patient Position: Sitting, Cuff Size: Adult)   Pulse 72   Temp 98.2 °F (36.8 °C) (Temporal)   Resp 18   Wt 89.9 kg (198 lb 4 oz)   BMI 32.99 kg/m²     Body mass index is 32.99 kg/m².  BMI cannot be calculated due to outdated height or weight values.  Please input a current height/weight in Vitals and re-renter BMIFOLLOWUP in Note to pull in correct documentation based on BMI range.     Physical Exam  Constitutional:       General: She is not in acute distress.  HENT:      Head: Normocephalic and atraumatic.      Mouth/Throat:      Pharynx: Oropharynx is clear.   Eyes:      Extraocular Movements: Extraocular movements intact.      Pupils: Pupils are equal, round, and reactive to light.   Neck:      Comments: No goiter.  Cardiovascular:      Rate and Rhythm: Normal rate and regular rhythm.      Pulses: Normal pulses.      Heart sounds: Normal heart sounds, S1 normal and S2 normal. Heart sounds not distant. No murmur heard.     No friction rub. No gallop.   Pulmonary:      Effort: Pulmonary " effort is normal.      Breath sounds: Normal breath sounds. No wheezing or rhonchi.   Musculoskeletal:      Cervical back: Neck supple.   Lymphadenopathy:      Cervical: No cervical adenopathy.   Neurological:      Mental Status: She is alert and oriented to person, place, and time.               Assessment and Plan  Diagnoses and all orders for this visit:      1. Discussion and weight loss, obesity.  - I had a very good discussion with the patient in regards to her weight loss journey. The patient is doing well in regards to her focus with weight loss. I do want her to focus on her calorie intake counting as well as getting her steps and exercising. We prioritize her daily schedule by allowing her to do focus on exercise, i.e. doing physical activity and exercise at least 1 to 2 times a week and then gradually build up from there. She also requests a nutritionist, so I am also going to do that as well.       Otherwise, I will see her back in 3 months.    Diagnoses and all orders for this visit:    1. Obesity with body mass index of 30.0-39.9 (Primary)  -     Ambulatory Referral to Nutrition Services          Follow Up   Return in about 3 months (around 2/6/2024).  Patient was given instructions and counseling regarding her condition or for health maintenance advice. Please see specific information pulled into the AVS if appropriate.      Transcribed from ambient dictation for Torsten Rivera MD by Moises Lindo.  11/06/23   18:19 EST    Patient or patient representative verbalized consent to the visit recording.  I have personally performed the services described in this document as transcribed by the above individual, and it is both accurate and complete.

## 2024-01-17 DIAGNOSIS — J45.50 SEVERE PERSISTENT ASTHMA, UNSPECIFIED WHETHER COMPLICATED: ICD-10-CM

## 2024-01-17 RX ORDER — MOMETASONE FUROATE AND FORMOTEROL FUMARATE DIHYDRATE 50; 5 UG/1; UG/1
2 AEROSOL RESPIRATORY (INHALATION)
Qty: 13 G | Refills: 2 | Status: SHIPPED | OUTPATIENT
Start: 2024-01-17

## 2024-02-24 RX ORDER — FLUTICASONE PROPIONATE AND SALMETEROL 100; 50 UG/1; UG/1
1 POWDER RESPIRATORY (INHALATION)
Qty: 1 EACH | Refills: 3 | Status: SHIPPED | OUTPATIENT
Start: 2024-02-24 | End: 2024-02-26

## 2024-02-26 DIAGNOSIS — J45.50 SEVERE PERSISTENT ASTHMA, UNSPECIFIED WHETHER COMPLICATED: Primary | ICD-10-CM

## 2024-02-26 RX ORDER — BUDESONIDE AND FORMOTEROL FUMARATE DIHYDRATE 160; 4.5 UG/1; UG/1
2 AEROSOL RESPIRATORY (INHALATION)
Qty: 10.2 G | Refills: 3 | Status: SHIPPED | OUTPATIENT
Start: 2024-02-26

## 2024-04-04 ENCOUNTER — TELEPHONE (OUTPATIENT)
Dept: FAMILY MEDICINE CLINIC | Facility: CLINIC | Age: 29
End: 2024-04-04

## 2024-04-04 ENCOUNTER — OFFICE VISIT (OUTPATIENT)
Dept: FAMILY MEDICINE CLINIC | Facility: CLINIC | Age: 29
End: 2024-04-04
Payer: COMMERCIAL

## 2024-04-04 VITALS
HEIGHT: 65 IN | RESPIRATION RATE: 16 BRPM | SYSTOLIC BLOOD PRESSURE: 104 MMHG | HEART RATE: 52 BPM | TEMPERATURE: 97.1 F | DIASTOLIC BLOOD PRESSURE: 68 MMHG | BODY MASS INDEX: 33.85 KG/M2 | WEIGHT: 203.2 LBS | OXYGEN SATURATION: 99 %

## 2024-04-04 DIAGNOSIS — J30.2 SEASONAL ALLERGIES: ICD-10-CM

## 2024-04-04 DIAGNOSIS — E66.9 OBESITY (BMI 30.0-34.9): ICD-10-CM

## 2024-04-04 DIAGNOSIS — R29.818 SUSPECTED SLEEP APNEA: ICD-10-CM

## 2024-04-04 DIAGNOSIS — B00.9 HSV-2 (HERPES SIMPLEX VIRUS 2) INFECTION: ICD-10-CM

## 2024-04-04 DIAGNOSIS — E66.9 OBESITY (BMI 30.0-34.9): Primary | ICD-10-CM

## 2024-04-04 DIAGNOSIS — R06.83 SNORES: Primary | ICD-10-CM

## 2024-04-04 RX ORDER — CHLORCYCLIZINE HYDROCHLORIDE AND PSEUDOEPHEDRINE HYDROCHLORIDE 25; 60 MG/1; MG/1
1 TABLET ORAL DAILY
Qty: 90 TABLET | Refills: 0 | Status: SHIPPED | OUTPATIENT
Start: 2024-04-04

## 2024-04-04 RX ORDER — VALACYCLOVIR HYDROCHLORIDE 500 MG/1
500 TABLET, FILM COATED ORAL DAILY PRN
Qty: 30 TABLET | Refills: 3 | Status: SHIPPED | OUTPATIENT
Start: 2024-04-04

## 2024-04-04 NOTE — TELEPHONE ENCOUNTER
Caller: Prachi Bailey    Relationship: Self    Best call back number: [phone]     What medication are you requesting: PHENTERMINE     What are your current symptoms: WEIGHT LOSS    Have you had these symptoms before:    [x] Yes  [] No    Have you been treated for these symptoms before:   [x] Yes  [] No    If a prescription is needed, what is your preferred pharmacy and phone number: [address] [phone] [facility] [phone]

## 2024-04-04 NOTE — PROGRESS NOTES
Chief Complaint  Establish Care (Transferring from [place] office. Dr Rivera), Sleep Concerns (Mother had sleep apnea, and dad was concerned due to her sounding like her mother when sleeping, snoring and stops breathing occasionally per .), and Weight Loss (Would like to discuss weight loss options.)    Subjective          Prachi Bailey presents to [facility]  History of Present Illness    The patient presents to establish care.    Obesity  The patient was previously under the care of Dr. Rivera. She has expressed interest in weight loss strategies, which she has previously discussed this with Dr. Rivera. She notes efforts to lose weight through regularly attending the gym. She expresses interest in reintroducing Lean shakes into her diet. A previous consultation with a nutritionist was postponed due to issues with transportation. She has not been prescribed any weight loss medications in the past.    Asthma  The patient's asthma is well controlled with regular use of Symbicort, and she does not require a refill of this medication at this time. She also has an albuterol inhaler and a nebulizer.    Herpes simplex  She requests a refill of her valacyclovir prescription, which she takes daily as needed for vaginal lesions. She reports no recent outbreaks.    Snoring  The patient's  has observed her snoring and cessation of breathing during sleep. She occasionally wakes up with pharyngeal pain. She does not note significant dry mouth upon waking, which she attributes frequently waking up during the night. She typically wakes up at 4:30 AM and experiences daytime sleepiness around lunchtime.    Heart murmur  She underwent echocardiograms in 2020 and 2019 to assess a heart murmur, which were normal. She had an episode of syncope, which has not recurred. She has been followed by Dr. Michael Cornejo MD, of cardiology.    Family history  Her mother had sleep  apnea.    The following portions of the patient's history were reviewed and updated as appropriate: allergies, current medications, past family history, past medical history, past social history, past surgical history, and problem list.    Objective      Physical Exam  Vitals reviewed.   Cardiovascular:      Rate and Rhythm: Normal rate.      Heart sounds: Murmur heard.       with a grade of 1/6.   Pulmonary:      Effort: Pulmonary effort is normal.      Breath sounds: Normal breath sounds. No wheezing or rhonchi.   Neurological:      Mental Status: She is alert.        Result Review :         Laboratory Studies  Thyroid, electrolytes, kidney function, liver enzymes were within normal limits. Complete blood count was normal. LDL cholesterol was slightly elevated. Total cholesterol and triglycerides were normal.    Imaging  Echocardiogram in 2020 showed no significant findings.           Assessment and Plan    Diagnoses and all orders for this visit:    1. Snores (Primary)  -     Ambulatory Referral to Sleep Medicine    2. Suspected sleep apnea  -     Ambulatory Referral to Sleep Medicine    3. Seasonal allergies  -     Chlorcyclizine-Pseudoephed (Stahist AD) 25-60 MG tablet; Take 1 tablet by mouth Daily.  Dispense: 90 tablet; Refill: 0    4. Obesity (BMI 30.0-34.9)  -     Ambulatory Referral to Nutrition Services    5. BMI 33.0-33.9,adult  -     Ambulatory Referral to Nutrition Services    6. HSV-2 (herpes simplex virus 2) infection  -     valACYclovir (VALTREX) 500 MG tablet; Take 1 tablet by mouth Daily As Needed (herpes outbreak).  Dispense: 30 tablet; Refill: 3      Snoring  A referral will be made to the sleep clinic for a home sleep study.    Weight management.  The patient will be referred to a nutritionist.   We discussed the risks and benefits of weight loss medications were discussed, including weekly injectables and phentermine.    Asthma.  She will continue her current medication regimen.  She will  receive a prescription for Stahist.  She was advised to hold Stahist if she decides to take phentermine for weight loss.    Herpes simplex.  The patient's valacyclovir prescription will be refilled.      Follow Up   Return in about 1 year (around 4/4/2025) for Annual.  Patient was given instructions and counseling regarding her condition or for health maintenance advice. Please see specific information pulled into the AVS if appropriate.    Transcribed from ambient dictation for Dunia Bagley DO by Aishwarya Pennington.  04/04/24   12:41 EDT    Patient or patient representative verbalized consent to the visit recording.  I have personally performed the services described in this document as transcribed by the above individual, and it is both accurate and complete.

## 2024-04-05 RX ORDER — PHENTERMINE HYDROCHLORIDE 37.5 MG/1
TABLET ORAL
Qty: 30 TABLET | Refills: 0 | Status: SHIPPED | OUTPATIENT
Start: 2024-04-05

## 2024-04-11 ENCOUNTER — PATIENT ROUNDING (BHMG ONLY) (OUTPATIENT)
Dept: FAMILY MEDICINE CLINIC | Facility: CLINIC | Age: 29
End: 2024-04-11
Payer: COMMERCIAL

## 2024-05-02 ENCOUNTER — TELEPHONE (OUTPATIENT)
Dept: FAMILY MEDICINE CLINIC | Facility: CLINIC | Age: 29
End: 2024-05-02
Payer: COMMERCIAL

## 2024-05-02 DIAGNOSIS — E66.9 OBESITY (BMI 30.0-34.9): ICD-10-CM

## 2024-05-02 RX ORDER — PHENTERMINE HYDROCHLORIDE 37.5 MG/1
TABLET ORAL
Qty: 30 TABLET | Refills: 0 | OUTPATIENT
Start: 2024-05-02

## 2024-05-02 NOTE — TELEPHONE ENCOUNTER
Caller: Prachi Bailey    Relationship: Self    Best call back number: [phone]     What was the call regarding: PATIENT WAS LAST SEEN ON 4/4/24 AND SHE COULD NOT REMEMBER IF DR. BULL WANTED HER TO FOLLOW UP IN ONE MONTH AND SHE DID NOT MAKE AN APPOINTMENT. SHE WOULD LIKE TO KNOW IF SHE NEEDED TO MAKE AN APPOINTMENT.

## 2024-05-09 ENCOUNTER — OFFICE VISIT (OUTPATIENT)
Dept: FAMILY MEDICINE CLINIC | Facility: CLINIC | Age: 29
End: 2024-05-09
Payer: COMMERCIAL

## 2024-05-09 VITALS
WEIGHT: 191.4 LBS | RESPIRATION RATE: 18 BRPM | TEMPERATURE: 97.3 F | OXYGEN SATURATION: 96 % | HEART RATE: 76 BPM | SYSTOLIC BLOOD PRESSURE: 112 MMHG | BODY MASS INDEX: 31.89 KG/M2 | DIASTOLIC BLOOD PRESSURE: 54 MMHG | HEIGHT: 65 IN

## 2024-05-09 DIAGNOSIS — E66.9 OBESITY (BMI 30.0-34.9): Primary | ICD-10-CM

## 2024-05-09 PROCEDURE — 99213 OFFICE O/P EST LOW 20 MIN: CPT | Performed by: FAMILY MEDICINE

## 2024-05-09 RX ORDER — PHENTERMINE HYDROCHLORIDE 37.5 MG/1
37.5 TABLET ORAL
Qty: 30 TABLET | Refills: 0 | Status: SHIPPED | OUTPATIENT
Start: 2024-05-09

## 2024-05-14 ENCOUNTER — HOSPITAL ENCOUNTER (OUTPATIENT)
Dept: NUTRITION | Facility: HOSPITAL | Age: 29
Setting detail: RECURRING SERIES
Discharge: HOME | End: 2024-05-14

## 2024-05-14 PROCEDURE — 97802 MEDICAL NUTRITION INDIV IN: CPT

## 2024-05-14 NOTE — CONSULTS
[facility] Nutrition [facility]          Initial 60 Minute Nutrition Visit    Date: 2024   Patient Name: Prachi Bailey  : 1995   MRN: [id number]   Referring Provider: Dunia Bagley DO    Reason for Visit: wt loss; pt is also being treated for asthma   Visit Format: TEAMS    Nutrition Assessment       Social History:   Social History     Socioeconomic History    Marital status:    Tobacco Use    Smoking status: Never    Smokeless tobacco: Never   Vaping Use    Vaping status: Never Used   Substance and Sexual Activity    Alcohol use: Yes     Alcohol/week: 1.0 standard drink of alcohol     Types: 1 Glasses of wine per week    Drug use: No    Sexual activity: Yes     Partners: Male     Birth control/protection: None     Active Problem List:   Patient Active Problem List    Diagnosis     Dizziness [R42]     Vasovagal syncope [R55]     Near syncope [R55]     Lightheadedness [R42]     Rash [R21]     Lung nodule [R91.1]     Severe persistent asthma [J45.50]     Cardiac murmur [R01.1]       Current Medications:   Current Outpatient Medications:     albuterol (PROVENTIL) (2.5 MG/3ML) 0.083% nebulizer solution, Take 2.5 mg by nebulization Every 4 (Four) Hours As Needed for Wheezing., Disp: 30 each, Rfl: 2    albuterol sulfate  (90 Base) MCG/ACT inhaler, Inhale 2 puffs Every 4 (Four) Hours As Needed for Wheezing., Disp: , Rfl:     budesonide-formoterol (Symbicort) 160-4.5 MCG/ACT inhaler, Inhale 2 puffs 2 (Two) Times a Day., Disp: 10.2 g, Rfl: 3    Chlorcyclizine-Pseudoephed (Stahist AD) 25-60 MG tablet, Take 1 tablet by mouth Daily. (Patient not taking: Reported on 2024), Disp: 90 tablet, Rfl: 0    phentermine (ADIPEX-P) 37.5 MG tablet, Take 1 tablet by mouth Every Morning Before Breakfast., Disp: 30 tablet, Rfl: 0    valACYclovir (VALTREX) 500 MG tablet, Take 1 tablet by mouth Daily As Needed (herpes outbreak)., Disp: 30 tablet, Rfl: 3    Labs: n/a    Hunger Vital Sign Food  "Insecurity Assessment:  Within the past 12 months I/we worried whether our food would run out before I/we got money to buy more: no   Within the past 12 months the food I/we bought just didn't last and I/we didn't have money to get more: no   Use of food assistance programs (WIC, food stamps, food [place]) no       Food & Nutrition Related History       Food Allergies: none  Food Intolerances: none indicated  Food Behavior: none identified  Nutrition Impact Symptoms:  none  Gastrointestinal conditions that impact intake or food choices: none  Details at home: lives with  and 2 kids;  is a    Who prepares most meals: patient   Who does grocery shopping: patient   How many meals are purchased from fast food/sit down restaurants per week:  unknown; did not discuss  Difficulty chewin - Normal  Difficulty swallowin - Normal  Diet requirement related to personal preference or cultural belief:  none indicated  History of eating disorder/disordered eating habits: None  Language/communication details: english  Barriers to learning: No barriers identified at this time    24 Hour Recall: please refer to questionnaire submission under misc reports for more information     Additional comments: Pt reports she typically eats 3 meals a day and will sometimes snack on things like chips. Pt drinks coffee, flavored water and 40 ounces of water daily. Pt is currently taking phentermine. Pt asked about protein shakes and granola bars. RD recommended RX bars and fairlife shakes to help supplement some protein intake. RD also recommended a brand for collagen powder as well.     Anthropometrics      Height:   Ht Readings from Last 1 Encounters:   24 165.1 cm (65\")     Weight:   Wt Readings from Last 3 Encounters:   24 86.8 kg (191 lb 6.4 oz)   24 92.2 kg (203 lb 3.2 oz)   23 89.9 kg (198 lb 4 oz)     BMI: There is no height or weight on file to calculate BMI.   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 Pt also has 2 kids as well.      Estimated Needs     Estimated Energy Needs: 1,600 (1.2, 400)    Estimated Protein Needs: specific needs not discussed at this time but RD did encourage protein intake at each meal and snack      Estimated Fluid Needs: minimum of 64 ounces daily      Discussion / Education      Consistency of meals: We discussed the importance of eating consistent, balanced meals to meet nutrient needs to promote satiety and satisfaction when eating. RD recommended patient to try and incorporate a small meal or snack in the morning and to avoid overeating and snacking throughout the day. RD explained meal patterns should be individualized from person to person. We discussed how sometimes cravings are trigger due to skipping meals and not incorporating all 3 categories of nutrients.      The components of a healthy meal and snack: We discussed how the three main nutrients our bodies need are protein, carbohydrates, and fat. RD recommended the patient aim to have a source of lean protein, fiber rich carbohydrates, and heart healthy fats with each of her meals and snacks. RD provided examples, such as having some yogurt with her sandwich for additional carbohydrates and protein rather than just having a sandwich by itself. We discussed the benefits this provides, such as meeting nutrient needs, and promoting satiety and satisfaction when eating.     Assessment of patient engagement: Asked appropriate questions    Measurement of understanding: Patient verbalized understanding    Resources Provided: RD provided resources after session:  Eating to feel your best  The 3 macronutrients  Macronutrient foundations  Quick meal ideas  Quick breakfast ideas    Goal (s)      Goal 1: balanced meals every 3-4 hours      Goal 2: balanced snacks     Plan of Care     PES Statement:   Overweight / Obesity related to diet and lifestye as evidenced by BMI.     Follow Up Visit      Follow Up:   No f/u scheduled at this time    Total of 60 minutes spent with patient on nutrition counseling. Education based on Academy of Nutrition and Dietetics guidelines. Patient was provided with RD's contact information. Thank you for this referral.

## 2024-06-07 ENCOUNTER — OFFICE VISIT (OUTPATIENT)
Dept: FAMILY MEDICINE CLINIC | Facility: CLINIC | Age: 29
End: 2024-06-07
Payer: COMMERCIAL

## 2024-06-07 VITALS
RESPIRATION RATE: 14 BRPM | OXYGEN SATURATION: 99 % | SYSTOLIC BLOOD PRESSURE: 110 MMHG | TEMPERATURE: 97.7 F | HEIGHT: 65 IN | HEART RATE: 73 BPM | WEIGHT: 185 LBS | DIASTOLIC BLOOD PRESSURE: 70 MMHG | BODY MASS INDEX: 30.82 KG/M2

## 2024-06-07 DIAGNOSIS — E66.9 OBESITY (BMI 30.0-34.9): ICD-10-CM

## 2024-06-07 PROCEDURE — 99213 OFFICE O/P EST LOW 20 MIN: CPT | Performed by: NURSE PRACTITIONER

## 2024-06-07 RX ORDER — PHENTERMINE HYDROCHLORIDE 37.5 MG/1
37.5 TABLET ORAL
Qty: 30 TABLET | Refills: 0 | Status: SHIPPED | OUTPATIENT
Start: 2024-06-07

## 2024-06-07 NOTE — PROGRESS NOTES
"  Date: 2024   Patient Name: Prachi Bailey  : 1995   MRN: [id number]     Chief Complaint:    Chief Complaint   Patient presents with    Follow-up     4 wk follow up for phentermine use       History of Present Illness: Prachi Bailey is a 28 y.o. female who is here today to follow up for Weight management. She has been on Phentermine for the last 2 months and has lost 18 lbs. She has seen a nutritionist. She is watching portion sizes and decreasing carbs, sugars and fatty/greasy foods. Increasing water intake. Increasing exercising             Review of Systems:   Review of Systems   Constitutional:  Negative for unexpected weight gain.   Cardiovascular:  Negative for chest pain and palpitations.       I have reviewed the patients family history, social history, past medical history, past surgical history and have updated it as appropriate.     Medications:     Current Outpatient Medications:     albuterol (PROVENTIL) (2.5 MG/3ML) 0.083% nebulizer solution, Take 2.5 mg by nebulization Every 4 (Four) Hours As Needed for Wheezing., Disp: 30 each, Rfl: 2    albuterol sulfate  (90 Base) MCG/ACT inhaler, Inhale 2 puffs Every 4 (Four) Hours As Needed for Wheezing., Disp: , Rfl:     budesonide-formoterol (Symbicort) 160-4.5 MCG/ACT inhaler, Inhale 2 puffs 2 (Two) Times a Day., Disp: 10.2 g, Rfl: 3    phentermine (ADIPEX-P) 37.5 MG tablet, Take 1 tablet by mouth Every Morning Before Breakfast., Disp: 30 tablet, Rfl: 0    valACYclovir (VALTREX) 500 MG tablet, Take 1 tablet by mouth Daily As Needed (herpes outbreak)., Disp: 30 tablet, Rfl: 3    Allergies:   No Known Allergies    PHQ-9 Total Score:       Physical Exam:  Vital Signs:   Vitals:    24 0923   BP: 110/70   Pulse: 73   Resp: 14   Temp: 97.7 °F (36.5 °C)   SpO2: 99%   Weight: 83.9 kg (185 lb)   Height: 165.1 cm (65\")     Body mass index is 30.79 kg/m².           Physical Exam  Vitals and nursing note reviewed.   Constitutional:       " Appearance: Normal appearance.   HENT:      Head: Normocephalic and atraumatic.   Cardiovascular:      Rate and Rhythm: Normal rate and regular rhythm.   Pulmonary:      Effort: Pulmonary effort is normal.      Breath sounds: Normal breath sounds.   Abdominal:      General: Bowel sounds are normal.   Skin:     General: Skin is warm.   Neurological:      General: No focal deficit present.      Mental Status: She is alert and oriented to person, place, and time.   Psychiatric:         Mood and Affect: Mood normal.           Assessment/Plan:   Diagnoses and all orders for this visit:    1. Obesity (BMI 30.0-34.9)  -     phentermine (ADIPEX-P) 37.5 MG tablet; Take 1 tablet by mouth Every Morning Before Breakfast.  Dispense: 30 tablet; Refill: 0       Weight loss journey:  Continue phentermine as prescribed in plan  Increase water intake  Decrease carbs, sugars, and fatty/greasy foods.   Decrease portion sizes.       Follow Up:   Return in about 4 weeks (around 7/5/2024) for Recheck weight management.      Yolanda Morales. EVELIN   [facility]

## 2024-06-26 ENCOUNTER — OFFICE VISIT (OUTPATIENT)
Dept: SLEEP MEDICINE | Age: 29
End: 2024-06-26
Payer: COMMERCIAL

## 2024-06-26 VITALS
OXYGEN SATURATION: 98 % | HEART RATE: 95 BPM | WEIGHT: 182 LBS | HEIGHT: 65 IN | TEMPERATURE: 97.3 F | BODY MASS INDEX: 30.32 KG/M2 | DIASTOLIC BLOOD PRESSURE: 80 MMHG | SYSTOLIC BLOOD PRESSURE: 122 MMHG

## 2024-06-26 DIAGNOSIS — G47.33 OBSTRUCTIVE SLEEP APNEA, ADULT: Primary | ICD-10-CM

## 2024-06-26 DIAGNOSIS — E66.9 OBESITY (BMI 30-39.9): ICD-10-CM

## 2024-06-26 DIAGNOSIS — R06.83 SNORING: ICD-10-CM

## 2024-06-26 DIAGNOSIS — G47.19 EXCESSIVE DAYTIME SLEEPINESS: ICD-10-CM

## 2024-06-26 PROCEDURE — 99204 OFFICE O/P NEW MOD 45 MIN: CPT | Performed by: INTERNAL MEDICINE

## 2024-06-26 RX ORDER — MULTIPLE VITAMINS W/ MINERALS TAB 9MG-400MCG
1 TAB ORAL DAILY
COMMUNITY

## 2024-06-26 NOTE — PROGRESS NOTES
Prachi Bailey is a 28 y.o. female.   Chief Complaint   Patient presents with    Sleeping Problem       HPI     28 y.o. female seen in consultation at the request of Dunia Bagley DO for evaluation of the above.     She has longstanding history of snoring.  She tells me she snored even as an elementary age child.  She also had problems with sleepwalking at that age but this improved over time though she can still somnambulate when she is under stress and can even eat.    According to her  she still snores and he does note apneas.  He thinks the snoring is loud.      From her standpoint, she has nonrestorative sleep and has daytime somnolence.  She is in bed from around 9 PM until 4:30 AM during the school year and until 6 AM during the summer.  She fall asleep fairly quickly and then awakens 2-4 times on average.    She notes no nocturnal hallucinations or sleep paralysis.  She has no cataplexy type symptoms during the day.    [place] Scale is: 3/24    The patient's relevant past medical, surgical, family, and social history reviewed and updated in Epic as appropriate.    Current medications are:   Current Outpatient Medications:     albuterol (PROVENTIL) (2.5 MG/3ML) 0.083% nebulizer solution, Take 2.5 mg by nebulization Every 4 (Four) Hours As Needed for Wheezing., Disp: 30 each, Rfl: 2    albuterol sulfate  (90 Base) MCG/ACT inhaler, Inhale 2 puffs Every 4 (Four) Hours As Needed for Wheezing., Disp: , Rfl:     budesonide-formoterol (Symbicort) 160-4.5 MCG/ACT inhaler, Inhale 2 puffs 2 (Two) Times a Day., Disp: 10.2 g, Rfl: 3    multivitamin with minerals tablet tablet, Take 1 tablet by mouth Daily., Disp: , Rfl:     phentermine (ADIPEX-P) 37.5 MG tablet, Take 1 tablet by mouth Every Morning Before Breakfast., Disp: 30 tablet, Rfl: 0    valACYclovir (VALTREX) 500 MG tablet, Take 1 tablet by mouth Daily As Needed (herpes outbreak)., Disp: 30 tablet, Rfl: 3.    Review of Systems    Review of  "Systems  ROS documented in patient questionnaire ×14 systems.  Reviewed with patient.  Otherwise negative except as noted in HPI.    Physical Exam    Blood pressure 122/80, pulse 95, temperature 97.3 °F (36.3 °C), height 165.1 cm (65\"), weight 82.6 kg (182 lb), SpO2 98%, not currently breastfeeding. Body mass index is 30.29 kg/m².    Physical Exam  Vitals and nursing note reviewed.   Constitutional:       Appearance: Normal appearance. She is well-developed.   HENT:      Head: Normocephalic and atraumatic.      Nose: Nose normal.      Mouth/Throat:      Mouth: Mucous membranes are moist.      Pharynx: Oropharynx is clear. No oropharyngeal exudate.      Comments: Class IV airway  Eyes:      General: No scleral icterus.     Conjunctiva/sclera: Conjunctivae normal.   Neck:      Thyroid: No thyromegaly.      Trachea: No tracheal deviation.   Cardiovascular:      Rate and Rhythm: Normal rate and regular rhythm.      Heart sounds: No murmur heard.     No friction rub. No gallop.   Pulmonary:      Effort: Pulmonary effort is normal. No respiratory distress.      Breath sounds: No wheezing or rales.   Musculoskeletal:         General: No deformity. Normal range of motion.   Skin:     General: Skin is warm and dry.      Findings: No rash.   Neurological:      Mental Status: She is alert and oriented to person, place, and time.   Psychiatric:         Behavior: Behavior normal.         Thought Content: Thought content normal.         DATA:    Reviewed 5/9/2024 note from Dunia Bagley D.O.    Reviewed bed partner questionnaire    ASSESSMENT:    Problem List Items Addressed This Visit    None  Visit Diagnoses       Obstructive sleep apnea, adult    -  Primary    Relevant Orders    Home Sleep Study    Snoring        Relevant Orders    Home Sleep Study    Excessive daytime sleepiness        Relevant Orders    Home Sleep Study    Obesity (BMI 30-39.9)                28-year-old female with snoring, witnessed apneas, a class IV " airway, and nonrestorative sleep.  She also has problems with intermittent somnambulism which can occur with increased stress.    I recommended a home sleep apnea test.  I think that if we can diagnose ADIA and treated appropriately this should go a long ways towards alleviating her parasomnias/somnambulism.  In the meantime I stressed safety measures to her.  She was agreeable to proceed as I have noted below.    PLAN:    - Home sleep apnea testing.  - Diagnostic process and potential treatment options discussed and questions/concerns addressed.  - Long-term cardiovascular and metabolic risks of untreated obstructive sleep apnea reviewed with the patient.  - The importance of long-term healthy weight loss discussed.  - Patient was agreeable to a trial of CPAP therapy on an initial trial basis if recommended after testing complete.  - Safety measures for somnambulism as noted above  - Sleep center follow-up.    I have reviewed the results of my evaluation and impression and discussed my recommendations in detail with the patient.    Signed by  Juan Manuel Christopher MD    June 26, 2024      CC: Dunia Bagley DO Kiser, Makayla Ann, DO

## 2024-07-02 DIAGNOSIS — J45.50 SEVERE PERSISTENT ASTHMA, UNSPECIFIED WHETHER COMPLICATED: ICD-10-CM

## 2024-07-02 DIAGNOSIS — B00.9 HSV-2 (HERPES SIMPLEX VIRUS 2) INFECTION: ICD-10-CM

## 2024-07-02 RX ORDER — BUDESONIDE AND FORMOTEROL FUMARATE DIHYDRATE 160; 4.5 UG/1; UG/1
2 AEROSOL RESPIRATORY (INHALATION) 2 TIMES DAILY
Qty: 10.2 G | Refills: 2 | OUTPATIENT
Start: 2024-07-02

## 2024-07-02 RX ORDER — VALACYCLOVIR HYDROCHLORIDE 500 MG/1
500 TABLET, FILM COATED ORAL DAILY PRN
Qty: 30 TABLET | Refills: 3 | Status: SHIPPED | OUTPATIENT
Start: 2024-07-02

## 2024-07-02 NOTE — TELEPHONE ENCOUNTER
LOV 11/06/2023  NOV     Tried to reach patient no answer left voicemail to return call    RELAY:  We recently received your message to refill your medication(s).  Our records indicate that you did not schedule a follow up appointment with your provider at your last visit on 11/06/2023. The medication that you are on requires a follow up appointment for additional refills. Patient was to schedule on or around 02/06/2024 for 3 month follow up    If she is unable to keep her appointment we will not be able to provider further refills.  Once appointment has been scheduled please update message with date and time so we can process the request.  We will forward the message to the provider to review the refill request.

## 2024-07-03 NOTE — TELEPHONE ENCOUNTER
2nd attempt     LOV 11/06/2023  NOV      Tried to reach patient no answer left voicemail to return call     RELAY:  We recently received your message to refill your medication(s).  Our records indicate that you did not schedule a follow up appointment with your provider at your last visit on 11/06/2023. The medication that you are on requires a follow up appointment for additional refills. Patient was to schedule on or around 02/06/2024 for 3 month follow up     If she is unable to keep her appointment we will not be able to provider further refills.  Once appointment has been scheduled please update message with date and time so we can process the request.  We will forward the message to the provider to review the refill request.

## 2024-07-05 RX ORDER — BUDESONIDE AND FORMOTEROL FUMARATE DIHYDRATE 160; 4.5 UG/1; UG/1
2 AEROSOL RESPIRATORY (INHALATION)
Qty: 10.2 G | Refills: 3 | Status: SHIPPED | OUTPATIENT
Start: 2024-07-05

## 2024-07-05 NOTE — TELEPHONE ENCOUNTER
3rd attempt      LOV 11/06/2023  NOV      Tried to reach patient no answer left voicemail to return call     RELAY:  We recently received your message to refill your medication(s).  Our records indicate that you did not schedule a follow up appointment with your provider at your last visit on 11/06/2023. The medication that you are on requires a follow up appointment for additional refills. Patient was to schedule on or around 02/06/2024 for 3 month follow up     If she is unable to keep her appointment we will not be able to provider further refills.  Once appointment has been scheduled please update message with date and time so we can process the request.  We will forward the message to the provider to review the refill request.

## 2024-07-11 ENCOUNTER — OFFICE VISIT (OUTPATIENT)
Dept: FAMILY MEDICINE CLINIC | Facility: CLINIC | Age: 29
End: 2024-07-11
Payer: COMMERCIAL

## 2024-07-11 VITALS
HEART RATE: 72 BPM | OXYGEN SATURATION: 98 % | WEIGHT: 181 LBS | TEMPERATURE: 98 F | HEIGHT: 65 IN | SYSTOLIC BLOOD PRESSURE: 110 MMHG | DIASTOLIC BLOOD PRESSURE: 78 MMHG | BODY MASS INDEX: 30.16 KG/M2

## 2024-07-11 DIAGNOSIS — E66.9 OBESITY (BMI 30.0-34.9): ICD-10-CM

## 2024-07-11 DIAGNOSIS — Z76.89 ENCOUNTER FOR WEIGHT MANAGEMENT: Primary | ICD-10-CM

## 2024-07-11 PROCEDURE — 99213 OFFICE O/P EST LOW 20 MIN: CPT | Performed by: PHYSICIAN ASSISTANT

## 2024-07-11 RX ORDER — PHENTERMINE HYDROCHLORIDE 37.5 MG/1
37.5 TABLET ORAL
Qty: 30 TABLET | Refills: 0 | Status: SHIPPED | OUTPATIENT
Start: 2024-07-11

## 2024-07-11 NOTE — PROGRESS NOTES
"Subjective   Prachi Bailey is a 28 y.o. female.     History of Present Illness   Pt presents for follow up for weight loss efforts  Down 4 lbs from last visit and 22 lbs total   Has completed 3 months of phentermine   Tolerating well. Some dry mouth. Constipation when first starting, but has since resolved   Notes this past month diet has not been the best, more parties cakes/ sweets/ pies. Plans on getting back on track with nutrition   Little routine exercise right now. Plans to start using elliptical at home more and doing home work out videos.   Denies and cardiac symptoms   Notes she has been waking up around 4:30 am and taking medicine first thing. Has noticed it has seemed to wear off and snacking more in the evenings    The following portions of the patient's history were reviewed and updated as appropriate: allergies, current medications, past family history, past medical history, past social history, past surgical history, and problem list.    Review of Systems  As noted per HPI     Objective   Blood pressure 110/78, pulse 72, temperature 98 °F (36.7 °C), height 165.1 cm (65\"), weight 82.1 kg (181 lb), SpO2 98%, not currently breastfeeding. Body mass index is 30.12 kg/m².   Physical Exam  Vitals reviewed.   Constitutional:       Appearance: Normal appearance. She is obese.   Cardiovascular:      Rate and Rhythm: Normal rate.   Pulmonary:      Effort: Pulmonary effort is normal.   Neurological:      Mental Status: She is alert and oriented to person, place, and time.   Psychiatric:         Mood and Affect: Mood normal.         Behavior: Behavior normal.         Assessment & Plan   Diagnoses and all orders for this visit:    1. Encounter for weight management (Primary)    2. Obesity (BMI 30.0-34.9)    Continue with phentermine treatment   Before next visit, work on increasing exercising, healthier food choices with decreased sweets, and taking medication a little later in the morning to get maximum " benefit of appetite suppression   F/u in one month

## 2024-08-15 ENCOUNTER — OFFICE VISIT (OUTPATIENT)
Dept: FAMILY MEDICINE CLINIC | Facility: CLINIC | Age: 29
End: 2024-08-15
Payer: COMMERCIAL

## 2024-08-15 VITALS
DIASTOLIC BLOOD PRESSURE: 60 MMHG | BODY MASS INDEX: 29.46 KG/M2 | HEIGHT: 65 IN | WEIGHT: 176.8 LBS | TEMPERATURE: 97.3 F | OXYGEN SATURATION: 98 % | RESPIRATION RATE: 16 BRPM | SYSTOLIC BLOOD PRESSURE: 104 MMHG | HEART RATE: 70 BPM

## 2024-08-15 DIAGNOSIS — E66.3 OVERWEIGHT (BMI 25.0-29.9): Primary | ICD-10-CM

## 2024-08-15 PROCEDURE — 99213 OFFICE O/P EST LOW 20 MIN: CPT | Performed by: FAMILY MEDICINE

## 2024-08-15 RX ORDER — PHENTERMINE HYDROCHLORIDE 37.5 MG/1
37.5 TABLET ORAL
Qty: 30 TABLET | Refills: 1 | Status: SHIPPED | OUTPATIENT
Start: 2024-08-15

## 2024-08-15 NOTE — PROGRESS NOTES
Chief Complaint  Follow-up (Phentermine)    Subjective          Prachi Bailey presents to [facility]  History of Present Illness  Weight loss follow up  She has been taking phentermine x 1 month.   States that she did not have any side effect from the medication  Down 5 lbs and wearing jeans that she couldn't last summer.   She has increased exercise.     Answers submitted by the patient for this visit:  Other (Submitted on 8/15/2024)  Please describe your symptoms.: Na  Have you had these symptoms before?: No  How long have you been having these symptoms?: 1-4 days  Please list any medications you are currently taking for this condition.: Na  Please describe any probable cause for these symptoms. : Na  Primary Reason for Visit (Submitted on 8/15/2024)  What is the primary reason for your visit?: Other    The following portions of the patient's history were reviewed and updated as appropriate: allergies, current medications, past family history, past medical history, past social history, past surgical history, and problem list.    Objective      Physical Exam  Vitals reviewed.   Constitutional:       Appearance: Normal appearance.   Pulmonary:      Effort: Pulmonary effort is normal. No respiratory distress.   Neurological:      Mental Status: She is alert.        Result Review :                Assessment and Plan    Diagnoses and all orders for this visit:    1. Overweight (BMI 25.0-29.9) (Primary)  -     phentermine (ADIPEX-P) 37.5 MG tablet; Take 1 tablet by mouth Every Morning Before Breakfast.  Dispense: 30 tablet; Refill: 1    2. BMI 29.0-29.9,adult    Continue Phentermine. Stop the medication and seek medical attention if chest pain, palpitations, agitation, SOA, headaches, or any other adverse reactions occur. Encouraged to continue dieting and exercising as tolerated.       Follow Up   Return in about 8 weeks (around 10/10/2024) for Recheck.  Patient was given  instructions and counseling regarding her condition or for health maintenance advice. Please see specific information pulled into the AVS if appropriate.

## 2024-09-30 ENCOUNTER — HOSPITAL ENCOUNTER (OUTPATIENT)
Dept: SLEEP MEDICINE | Facility: HOSPITAL | Age: 29
End: 2024-09-30
Payer: COMMERCIAL

## 2024-09-30 VITALS — HEIGHT: 65 IN | WEIGHT: 176.81 LBS | BODY MASS INDEX: 29.46 KG/M2

## 2024-09-30 DIAGNOSIS — G47.19 EXCESSIVE DAYTIME SLEEPINESS: ICD-10-CM

## 2024-09-30 DIAGNOSIS — G47.33 OBSTRUCTIVE SLEEP APNEA, ADULT: ICD-10-CM

## 2024-09-30 DIAGNOSIS — R06.83 SNORING: ICD-10-CM

## 2024-09-30 PROCEDURE — 95800 SLP STDY UNATTENDED: CPT

## 2024-10-07 DIAGNOSIS — G47.33 OSA (OBSTRUCTIVE SLEEP APNEA): Primary | ICD-10-CM

## 2024-10-07 DIAGNOSIS — G47.50 PARASOMNIA, UNSPECIFIED TYPE: ICD-10-CM

## 2024-10-07 PROCEDURE — 95800 SLP STDY UNATTENDED: CPT | Performed by: INTERNAL MEDICINE

## 2024-11-20 DIAGNOSIS — J45.50 SEVERE PERSISTENT ASTHMA, UNSPECIFIED WHETHER COMPLICATED: ICD-10-CM

## 2024-11-20 RX ORDER — BUDESONIDE AND FORMOTEROL FUMARATE DIHYDRATE 160; 4.5 UG/1; UG/1
2 AEROSOL RESPIRATORY (INHALATION)
Qty: 10.2 G | Refills: 3 | Status: SHIPPED | OUTPATIENT
Start: 2024-11-20

## 2024-11-20 NOTE — TELEPHONE ENCOUNTER
Caller: Leo Prachi Cata    Relationship: Self    Best call back number: [phone]     Requested Prescriptions:   Requested Prescriptions     Pending Prescriptions Disp Refills    budesonide-formoterol (Symbicort) 160-4.5 MCG/ACT inhaler 10.2 g 3     Sig: Inhale 2 puffs 2 (Two) Times a Day.        Pharmacy where request should be sent: [address] [phone] [facility] [phone]      Last office visit with prescribing clinician: 8/15/2024   Last telemedicine visit with prescribing clinician: Visit date not found   Next office visit with prescribing clinician: Visit date not found     Additional details provided by patient: PATIENT IS OUT     Does the patient have less than a 3 day supply:  [x] Yes  [] No    Would you like a call back once the refill request has been completed: [] Yes [x] No    If the office needs to give you a call back, can they leave a voicemail: [] Yes [x] No    Jassi Dunlap Rep   11/20/24 11:32 EST

## 2025-01-29 ENCOUNTER — TELEPHONE (OUTPATIENT)
Dept: FAMILY MEDICINE CLINIC | Facility: CLINIC | Age: 30
End: 2025-01-29
Payer: COMMERCIAL

## 2025-01-29 NOTE — TELEPHONE ENCOUNTER
Caller: Prachi Bailey    Relationship to patient: Self      Best call back number: [phone]     Provider: DR BULL    Medication PA needed:     budesonide-formoterol (Symbicort) 160-4.5 MCG/ACT inhaler       Reason for call/Prior Auth: INSURANCE

## 2025-01-29 NOTE — TELEPHONE ENCOUNTER
Spoke with pt, medication does not need a PA. Copay is 200$. She will contact her insurance to see what is on the formulary and contact office back.

## 2025-02-07 ENCOUNTER — TELEPHONE (OUTPATIENT)
Dept: FAMILY MEDICINE CLINIC | Facility: CLINIC | Age: 30
End: 2025-02-07
Payer: COMMERCIAL

## 2025-02-07 NOTE — TELEPHONE ENCOUNTER
PATIENT IS CALLING TO STATE THAT SHE NEEDED A PRIOR AUTHORIZATION ON SYMBICORT.  THE OTHER MEDICATIONS THAT THE INSURANCE WOULD LIKE HER TO TRY THOUGH ARE BUDESOMIDE, WIXELA, AND EREYNA.

## 2025-02-10 ENCOUNTER — PRIOR AUTHORIZATION (OUTPATIENT)
Dept: FAMILY MEDICINE CLINIC | Facility: CLINIC | Age: 30
End: 2025-02-10
Payer: COMMERCIAL

## 2025-02-10 NOTE — TELEPHONE ENCOUNTER
PA submitted, pending determination. Noted that pt has tried generic Wixela and budesonide. See other encounter

## 2025-04-09 DIAGNOSIS — J45.50 SEVERE PERSISTENT ASTHMA, UNSPECIFIED WHETHER COMPLICATED: ICD-10-CM

## 2025-04-09 RX ORDER — BUDESONIDE AND FORMOTEROL FUMARATE DIHYDRATE 160; 4.5 UG/1; UG/1
AEROSOL RESPIRATORY (INHALATION)
Qty: 10.2 G | Refills: 2 | Status: SHIPPED | OUTPATIENT
Start: 2025-04-09

## 2025-07-18 DIAGNOSIS — J45.50 SEVERE PERSISTENT ASTHMA, UNSPECIFIED WHETHER COMPLICATED: ICD-10-CM

## 2025-07-18 RX ORDER — BUDESONIDE AND FORMOTEROL FUMARATE DIHYDRATE 160; 4.5 UG/1; UG/1
AEROSOL RESPIRATORY (INHALATION)
Qty: 10.2 G | Refills: 2 | OUTPATIENT
Start: 2025-07-18

## 2025-07-18 RX ORDER — BUDESONIDE AND FORMOTEROL FUMARATE DIHYDRATE 160; 4.5 UG/1; UG/1
AEROSOL RESPIRATORY (INHALATION)
Qty: 10.2 G | Refills: 1 | Status: SHIPPED | OUTPATIENT
Start: 2025-07-18

## 2025-08-22 DIAGNOSIS — J45.50 SEVERE PERSISTENT ASTHMA, UNSPECIFIED WHETHER COMPLICATED: ICD-10-CM

## 2025-08-25 RX ORDER — BUDESONIDE AND FORMOTEROL FUMARATE DIHYDRATE 160; 4.5 UG/1; UG/1
2 AEROSOL RESPIRATORY (INHALATION)
Qty: 10.2 G | Refills: 0 | Status: SHIPPED | OUTPATIENT
Start: 2025-08-25